# Patient Record
Sex: FEMALE | Race: WHITE | HISPANIC OR LATINO | URBAN - METROPOLITAN AREA
[De-identification: names, ages, dates, MRNs, and addresses within clinical notes are randomized per-mention and may not be internally consistent; named-entity substitution may affect disease eponyms.]

---

## 2019-06-09 ENCOUNTER — APPOINTMENT (OUTPATIENT)
Dept: RADIOLOGY | Facility: MEDICAL CENTER | Age: 74
DRG: 023 | End: 2019-06-09
Attending: EMERGENCY MEDICINE

## 2019-06-09 ENCOUNTER — HOSPITAL ENCOUNTER (INPATIENT)
Facility: MEDICAL CENTER | Age: 74
LOS: 5 days | DRG: 023 | End: 2019-06-14
Attending: EMERGENCY MEDICINE | Admitting: INTERNAL MEDICINE

## 2019-06-09 LAB
ALBUMIN SERPL BCP-MCNC: 4.7 G/DL (ref 3.2–4.9)
ALBUMIN/GLOB SERPL: 1.3 G/DL
ALP SERPL-CCNC: 69 U/L (ref 30–99)
ALT SERPL-CCNC: 23 U/L (ref 2–50)
ANION GAP SERPL CALC-SCNC: 10 MMOL/L (ref 0–11.9)
APTT PPP: 24.2 SEC (ref 24.7–36)
AST SERPL-CCNC: 33 U/L (ref 12–45)
BASOPHILS # BLD AUTO: 0.5 % (ref 0–1.8)
BASOPHILS # BLD: 0.04 K/UL (ref 0–0.12)
BILIRUB SERPL-MCNC: 0.3 MG/DL (ref 0.1–1.5)
BUN SERPL-MCNC: 18 MG/DL (ref 8–22)
CALCIUM SERPL-MCNC: 9.6 MG/DL (ref 8.5–10.5)
CHLORIDE SERPL-SCNC: 103 MMOL/L (ref 96–112)
CO2 SERPL-SCNC: 25 MMOL/L (ref 20–33)
CREAT SERPL-MCNC: 1.16 MG/DL (ref 0.5–1.4)
EKG IMPRESSION: NORMAL
EOSINOPHIL # BLD AUTO: 0.12 K/UL (ref 0–0.51)
EOSINOPHIL NFR BLD: 1.6 % (ref 0–6.9)
ERYTHROCYTE [DISTWIDTH] IN BLOOD BY AUTOMATED COUNT: 43.1 FL (ref 35.9–50)
GLOBULIN SER CALC-MCNC: 3.5 G/DL (ref 1.9–3.5)
GLUCOSE BLD-MCNC: 112 MG/DL (ref 65–99)
GLUCOSE SERPL-MCNC: 111 MG/DL (ref 65–99)
HCT VFR BLD AUTO: 40.2 % (ref 37–47)
HGB BLD-MCNC: 12.9 G/DL (ref 12–16)
IMM GRANULOCYTES # BLD AUTO: 0.02 K/UL (ref 0–0.11)
IMM GRANULOCYTES NFR BLD AUTO: 0.3 % (ref 0–0.9)
INR PPP: 1.12 (ref 0.87–1.13)
LYMPHOCYTES # BLD AUTO: 4.2 K/UL (ref 1–4.8)
LYMPHOCYTES NFR BLD: 55.9 % (ref 22–41)
MCH RBC QN AUTO: 28.7 PG (ref 27–33)
MCHC RBC AUTO-ENTMCNC: 32.1 G/DL (ref 33.6–35)
MCV RBC AUTO: 89.5 FL (ref 81.4–97.8)
MONOCYTES # BLD AUTO: 0.58 K/UL (ref 0–0.85)
MONOCYTES NFR BLD AUTO: 7.7 % (ref 0–13.4)
NEUTROPHILS # BLD AUTO: 2.56 K/UL (ref 2–7.15)
NEUTROPHILS NFR BLD: 34 % (ref 44–72)
NRBC # BLD AUTO: 0 K/UL
NRBC BLD-RTO: 0 /100 WBC
PLATELET # BLD AUTO: 478 K/UL (ref 164–446)
PMV BLD AUTO: 10.1 FL (ref 9–12.9)
POTASSIUM SERPL-SCNC: 4.3 MMOL/L (ref 3.6–5.5)
PROT SERPL-MCNC: 8.2 G/DL (ref 6–8.2)
PROTHROMBIN TIME: 14.7 SEC (ref 12–14.6)
RBC # BLD AUTO: 4.49 M/UL (ref 4.2–5.4)
SODIUM SERPL-SCNC: 138 MMOL/L (ref 135–145)
TROPONIN I SERPL-MCNC: <0.01 NG/ML (ref 0–0.04)
WBC # BLD AUTO: 7.5 K/UL (ref 4.8–10.8)

## 2019-06-09 PROCEDURE — 82962 GLUCOSE BLOOD TEST: CPT

## 2019-06-09 PROCEDURE — 700117 HCHG RX CONTRAST REV CODE 255: Performed by: EMERGENCY MEDICINE

## 2019-06-09 PROCEDURE — 80053 COMPREHEN METABOLIC PANEL: CPT

## 2019-06-09 PROCEDURE — 70498 CT ANGIOGRAPHY NECK: CPT

## 2019-06-09 PROCEDURE — 84443 ASSAY THYROID STIM HORMONE: CPT

## 2019-06-09 PROCEDURE — 3E03317 INTRODUCTION OF OTHER THROMBOLYTIC INTO PERIPHERAL VEIN, PERCUTANEOUS APPROACH: ICD-10-PCS | Performed by: PSYCHIATRY & NEUROLOGY

## 2019-06-09 PROCEDURE — 37195 THROMBOLYTIC THERAPY STROKE: CPT

## 2019-06-09 PROCEDURE — 83735 ASSAY OF MAGNESIUM: CPT

## 2019-06-09 PROCEDURE — 85610 PROTHROMBIN TIME: CPT

## 2019-06-09 PROCEDURE — 99291 CRITICAL CARE FIRST HOUR: CPT

## 2019-06-09 PROCEDURE — 84484 ASSAY OF TROPONIN QUANT: CPT

## 2019-06-09 PROCEDURE — 0042T CT-CEREBRAL PERFUSION ANALYSIS: CPT

## 2019-06-09 PROCEDURE — 94760 N-INVAS EAR/PLS OXIMETRY 1: CPT

## 2019-06-09 PROCEDURE — 99223 1ST HOSP IP/OBS HIGH 75: CPT | Performed by: PSYCHIATRY & NEUROLOGY

## 2019-06-09 PROCEDURE — 85025 COMPLETE CBC W/AUTO DIFF WBC: CPT

## 2019-06-09 PROCEDURE — 93005 ELECTROCARDIOGRAM TRACING: CPT | Performed by: EMERGENCY MEDICINE

## 2019-06-09 PROCEDURE — 70496 CT ANGIOGRAPHY HEAD: CPT

## 2019-06-09 PROCEDURE — 770022 HCHG ROOM/CARE - ICU (200)

## 2019-06-09 PROCEDURE — 700111 HCHG RX REV CODE 636 W/ 250 OVERRIDE (IP): Performed by: PSYCHIATRY & NEUROLOGY

## 2019-06-09 PROCEDURE — 85730 THROMBOPLASTIN TIME PARTIAL: CPT

## 2019-06-09 PROCEDURE — 70450 CT HEAD/BRAIN W/O DYE: CPT

## 2019-06-09 PROCEDURE — 80061 LIPID PANEL: CPT

## 2019-06-09 RX ORDER — SODIUM CHLORIDE 9 MG/ML
50 INJECTION, SOLUTION INTRAVENOUS ONCE
Status: COMPLETED | OUTPATIENT
Start: 2019-06-10 | End: 2019-06-10

## 2019-06-09 RX ADMIN — IOHEXOL 120 ML: 350 INJECTION, SOLUTION INTRAVENOUS at 23:13

## 2019-06-09 RX ADMIN — ALTEPLASE 44.1 MG: KIT at 23:49

## 2019-06-10 ENCOUNTER — HOSPITAL ENCOUNTER (INPATIENT)
Dept: RADIOLOGY | Facility: MEDICAL CENTER | Age: 74
DRG: 023 | End: 2019-06-10
Attending: EMERGENCY MEDICINE

## 2019-06-10 PROBLEM — I10 HYPERTENSION: Status: ACTIVE | Noted: 2019-06-10

## 2019-06-10 PROBLEM — I63.9 STROKE (HCC): Status: ACTIVE | Noted: 2019-06-10

## 2019-06-10 PROBLEM — E78.5 DYSLIPIDEMIA: Status: ACTIVE | Noted: 2019-06-10

## 2019-06-10 LAB
ABO + RH BLD: NORMAL
ABO GROUP BLD: NORMAL
ANION GAP SERPL CALC-SCNC: 8 MMOL/L (ref 0–11.9)
BASOPHILS # BLD AUTO: 0.3 % (ref 0–1.8)
BASOPHILS # BLD: 0.02 K/UL (ref 0–0.12)
BLD GP AB SCN SERPL QL: NORMAL
BUN SERPL-MCNC: 16 MG/DL (ref 8–22)
CALCIUM SERPL-MCNC: 8.3 MG/DL (ref 8.5–10.5)
CHLORIDE SERPL-SCNC: 107 MMOL/L (ref 96–112)
CHOLEST SERPL-MCNC: 192 MG/DL (ref 100–199)
CO2 SERPL-SCNC: 22 MMOL/L (ref 20–33)
CREAT SERPL-MCNC: 1.07 MG/DL (ref 0.5–1.4)
EOSINOPHIL # BLD AUTO: 0.01 K/UL (ref 0–0.51)
EOSINOPHIL NFR BLD: 0.1 % (ref 0–6.9)
ERYTHROCYTE [DISTWIDTH] IN BLOOD BY AUTOMATED COUNT: 44.8 FL (ref 35.9–50)
EST. AVERAGE GLUCOSE BLD GHB EST-MCNC: 134 MG/DL
GLUCOSE SERPL-MCNC: 151 MG/DL (ref 65–99)
HBA1C MFR BLD: 6.3 % (ref 0–5.6)
HCT VFR BLD AUTO: 38.1 % (ref 37–47)
HDLC SERPL-MCNC: 48 MG/DL
HGB BLD-MCNC: 12.1 G/DL (ref 12–16)
IMM GRANULOCYTES # BLD AUTO: 0.02 K/UL (ref 0–0.11)
IMM GRANULOCYTES NFR BLD AUTO: 0.3 % (ref 0–0.9)
LDLC SERPL CALC-MCNC: 112 MG/DL
LYMPHOCYTES # BLD AUTO: 1.09 K/UL (ref 1–4.8)
LYMPHOCYTES NFR BLD: 15.2 % (ref 22–41)
MAGNESIUM SERPL-MCNC: 1.6 MG/DL (ref 1.5–2.5)
MAGNESIUM SERPL-MCNC: 2 MG/DL (ref 1.5–2.5)
MCH RBC QN AUTO: 28.8 PG (ref 27–33)
MCHC RBC AUTO-ENTMCNC: 31.8 G/DL (ref 33.6–35)
MCV RBC AUTO: 90.7 FL (ref 81.4–97.8)
MONOCYTES # BLD AUTO: 0.27 K/UL (ref 0–0.85)
MONOCYTES NFR BLD AUTO: 3.8 % (ref 0–13.4)
NEUTROPHILS # BLD AUTO: 5.77 K/UL (ref 2–7.15)
NEUTROPHILS NFR BLD: 80.3 % (ref 44–72)
NRBC # BLD AUTO: 0 K/UL
NRBC BLD-RTO: 0 /100 WBC
PLATELET # BLD AUTO: 412 K/UL (ref 164–446)
PMV BLD AUTO: 9.9 FL (ref 9–12.9)
POTASSIUM SERPL-SCNC: 4.1 MMOL/L (ref 3.6–5.5)
RBC # BLD AUTO: 4.2 M/UL (ref 4.2–5.4)
RH BLD: NORMAL
SODIUM SERPL-SCNC: 137 MMOL/L (ref 135–145)
TRIGL SERPL-MCNC: 158 MG/DL (ref 0–149)
TSH SERPL DL<=0.005 MIU/L-ACNC: 1.27 UIU/ML (ref 0.38–5.33)
WBC # BLD AUTO: 7.2 K/UL (ref 4.8–10.8)

## 2019-06-10 PROCEDURE — 99291 CRITICAL CARE FIRST HOUR: CPT | Performed by: INTERNAL MEDICINE

## 2019-06-10 PROCEDURE — A9270 NON-COVERED ITEM OR SERVICE: HCPCS | Performed by: HOSPITALIST

## 2019-06-10 PROCEDURE — 700105 HCHG RX REV CODE 258: Performed by: INTERNAL MEDICINE

## 2019-06-10 PROCEDURE — 86850 RBC ANTIBODY SCREEN: CPT

## 2019-06-10 PROCEDURE — 83036 HEMOGLOBIN GLYCOSYLATED A1C: CPT

## 2019-06-10 PROCEDURE — B3131ZZ FLUOROSCOPY OF RIGHT COMMON CAROTID ARTERY USING LOW OSMOLAR CONTRAST: ICD-10-PCS | Performed by: RADIOLOGY

## 2019-06-10 PROCEDURE — 99233 SBSQ HOSP IP/OBS HIGH 50: CPT | Performed by: PSYCHIATRY & NEUROLOGY

## 2019-06-10 PROCEDURE — 700111 HCHG RX REV CODE 636 W/ 250 OVERRIDE (IP)

## 2019-06-10 PROCEDURE — B3161ZZ FLUOROSCOPY OF RIGHT INTERNAL CAROTID ARTERY USING LOW OSMOLAR CONTRAST: ICD-10-PCS | Performed by: RADIOLOGY

## 2019-06-10 PROCEDURE — 83735 ASSAY OF MAGNESIUM: CPT

## 2019-06-10 PROCEDURE — 700102 HCHG RX REV CODE 250 W/ 637 OVERRIDE(OP): Performed by: HOSPITALIST

## 2019-06-10 PROCEDURE — 4410455 IR-THROMBO MECHANICAL ARTERY,INIT

## 2019-06-10 PROCEDURE — 700105 HCHG RX REV CODE 258: Performed by: PSYCHIATRY & NEUROLOGY

## 2019-06-10 PROCEDURE — 86901 BLOOD TYPING SEROLOGIC RH(D): CPT

## 2019-06-10 PROCEDURE — 86900 BLOOD TYPING SEROLOGIC ABO: CPT

## 2019-06-10 PROCEDURE — 80048 BASIC METABOLIC PNL TOTAL CA: CPT

## 2019-06-10 PROCEDURE — 85025 COMPLETE CBC W/AUTO DIFF WBC: CPT

## 2019-06-10 PROCEDURE — A9270 NON-COVERED ITEM OR SERVICE: HCPCS | Performed by: INTERNAL MEDICINE

## 2019-06-10 PROCEDURE — 700117 HCHG RX CONTRAST REV CODE 255: Performed by: RADIOLOGY

## 2019-06-10 PROCEDURE — 92610 EVALUATE SWALLOWING FUNCTION: CPT

## 2019-06-10 PROCEDURE — 71045 X-RAY EXAM CHEST 1 VIEW: CPT

## 2019-06-10 PROCEDURE — 700102 HCHG RX REV CODE 250 W/ 637 OVERRIDE(OP): Performed by: INTERNAL MEDICINE

## 2019-06-10 PROCEDURE — 700111 HCHG RX REV CODE 636 W/ 250 OVERRIDE (IP): Performed by: HOSPITALIST

## 2019-06-10 PROCEDURE — 770022 HCHG ROOM/CARE - ICU (200)

## 2019-06-10 PROCEDURE — 03CG3ZZ EXTIRPATION OF MATTER FROM INTRACRANIAL ARTERY, PERCUTANEOUS APPROACH: ICD-10-PCS | Performed by: RADIOLOGY

## 2019-06-10 PROCEDURE — 99223 1ST HOSP IP/OBS HIGH 75: CPT | Performed by: INTERNAL MEDICINE

## 2019-06-10 RX ORDER — MAGNESIUM SULFATE HEPTAHYDRATE 40 MG/ML
2 INJECTION, SOLUTION INTRAVENOUS ONCE
Status: COMPLETED | OUTPATIENT
Start: 2019-06-10 | End: 2019-06-10

## 2019-06-10 RX ORDER — ATORVASTATIN CALCIUM 40 MG/1
40 TABLET, FILM COATED ORAL EVERY EVENING
Status: DISCONTINUED | OUTPATIENT
Start: 2019-06-10 | End: 2019-06-14 | Stop reason: HOSPADM

## 2019-06-10 RX ORDER — MIDAZOLAM HYDROCHLORIDE 1 MG/ML
.5-2 INJECTION INTRAMUSCULAR; INTRAVENOUS PRN
Status: DISCONTINUED | OUTPATIENT
Start: 2019-06-10 | End: 2019-06-10 | Stop reason: HOSPADM

## 2019-06-10 RX ORDER — ACETAMINOPHEN 325 MG/1
650 TABLET ORAL EVERY 4 HOURS PRN
Status: DISCONTINUED | OUTPATIENT
Start: 2019-06-10 | End: 2019-06-14 | Stop reason: HOSPADM

## 2019-06-10 RX ORDER — ONDANSETRON 2 MG/ML
4 INJECTION INTRAMUSCULAR; INTRAVENOUS PRN
Status: DISCONTINUED | OUTPATIENT
Start: 2019-06-10 | End: 2019-06-10 | Stop reason: HOSPADM

## 2019-06-10 RX ORDER — ASPIRIN 81 MG/1
324 TABLET, CHEWABLE ORAL DAILY
Status: DISCONTINUED | OUTPATIENT
Start: 2019-06-11 | End: 2019-06-14 | Stop reason: HOSPADM

## 2019-06-10 RX ORDER — SODIUM CHLORIDE 9 MG/ML
INJECTION, SOLUTION INTRAVENOUS CONTINUOUS
Status: DISCONTINUED | OUTPATIENT
Start: 2019-06-10 | End: 2019-06-10

## 2019-06-10 RX ORDER — ASPIRIN 325 MG
325 TABLET ORAL DAILY
Status: DISCONTINUED | OUTPATIENT
Start: 2019-06-11 | End: 2019-06-14 | Stop reason: HOSPADM

## 2019-06-10 RX ORDER — HYDRALAZINE HYDROCHLORIDE 20 MG/ML
10 INJECTION INTRAMUSCULAR; INTRAVENOUS
Status: DISCONTINUED | OUTPATIENT
Start: 2019-06-10 | End: 2019-06-14 | Stop reason: HOSPADM

## 2019-06-10 RX ORDER — LABETALOL HYDROCHLORIDE 5 MG/ML
10 INJECTION, SOLUTION INTRAVENOUS EVERY 4 HOURS PRN
Status: DISCONTINUED | OUTPATIENT
Start: 2019-06-10 | End: 2019-06-10

## 2019-06-10 RX ORDER — LABETALOL HYDROCHLORIDE 5 MG/ML
10 INJECTION, SOLUTION INTRAVENOUS EVERY 4 HOURS PRN
Status: DISCONTINUED | OUTPATIENT
Start: 2019-06-10 | End: 2019-06-14 | Stop reason: HOSPADM

## 2019-06-10 RX ORDER — HYDRALAZINE HYDROCHLORIDE 20 MG/ML
10 INJECTION INTRAMUSCULAR; INTRAVENOUS
Status: DISCONTINUED | OUTPATIENT
Start: 2019-06-10 | End: 2019-06-10

## 2019-06-10 RX ORDER — ASPIRIN 300 MG/1
300 SUPPOSITORY RECTAL DAILY
Status: DISCONTINUED | OUTPATIENT
Start: 2019-06-11 | End: 2019-06-14 | Stop reason: HOSPADM

## 2019-06-10 RX ORDER — SODIUM CHLORIDE 9 MG/ML
500 INJECTION, SOLUTION INTRAVENOUS
Status: DISCONTINUED | OUTPATIENT
Start: 2019-06-10 | End: 2019-06-10 | Stop reason: HOSPADM

## 2019-06-10 RX ORDER — MIDAZOLAM HYDROCHLORIDE 1 MG/ML
INJECTION INTRAMUSCULAR; INTRAVENOUS
Status: COMPLETED
Start: 2019-06-10 | End: 2019-06-10

## 2019-06-10 RX ADMIN — ACETAMINOPHEN 650 MG: 325 TABLET, FILM COATED ORAL at 22:12

## 2019-06-10 RX ADMIN — MIDAZOLAM HYDROCHLORIDE 1 MG: 1 INJECTION, SOLUTION INTRAMUSCULAR; INTRAVENOUS at 01:20

## 2019-06-10 RX ADMIN — MIDAZOLAM HYDROCHLORIDE 1 MG: 1 INJECTION INTRAMUSCULAR; INTRAVENOUS at 01:20

## 2019-06-10 RX ADMIN — HYDRALAZINE HYDROCHLORIDE 10 MG: 20 INJECTION INTRAMUSCULAR; INTRAVENOUS at 21:06

## 2019-06-10 RX ADMIN — SODIUM CHLORIDE 50 ML: 9 INJECTION, SOLUTION INTRAVENOUS at 00:49

## 2019-06-10 RX ADMIN — SODIUM CHLORIDE: 9 INJECTION, SOLUTION INTRAVENOUS at 03:03

## 2019-06-10 RX ADMIN — MAGNESIUM SULFATE IN WATER 2 G: 40 INJECTION, SOLUTION INTRAVENOUS at 11:15

## 2019-06-10 RX ADMIN — IOHEXOL 70 ML: 300 INJECTION, SOLUTION INTRAVENOUS at 01:30

## 2019-06-10 RX ADMIN — ACETAMINOPHEN 650 MG: 325 TABLET, FILM COATED ORAL at 16:18

## 2019-06-10 RX ADMIN — ATORVASTATIN CALCIUM 40 MG: 40 TABLET, FILM COATED ORAL at 18:41

## 2019-06-10 ASSESSMENT — ENCOUNTER SYMPTOMS
BACK PAIN: 0
DOUBLE VISION: 0
DIZZINESS: 0
SHORTNESS OF BREATH: 0
FEVER: 0
HEADACHES: 0
NECK PAIN: 0
SENSORY CHANGE: 0
FOCAL WEAKNESS: 0
CHILLS: 0
SORE THROAT: 0

## 2019-06-10 ASSESSMENT — COGNITIVE AND FUNCTIONAL STATUS - GENERAL
MOBILITY SCORE: 15
MOVING FROM LYING ON BACK TO SITTING ON SIDE OF FLAT BED: UNABLE
DAILY ACTIVITIY SCORE: 24
SUGGESTED CMS G CODE MODIFIER DAILY ACTIVITY: CH
SUGGESTED CMS G CODE MODIFIER MOBILITY: CK
MOVING TO AND FROM BED TO CHAIR: UNABLE
TURNING FROM BACK TO SIDE WHILE IN FLAT BAD: UNABLE

## 2019-06-10 ASSESSMENT — LIFESTYLE VARIABLES
EVER_SMOKED: NEVER
ALCOHOL_USE: NO
EVER_SMOKED: NEVER

## 2019-06-10 ASSESSMENT — PATIENT HEALTH QUESTIONNAIRE - PHQ9
2. FEELING DOWN, DEPRESSED, IRRITABLE, OR HOPELESS: NOT AT ALL
1. LITTLE INTEREST OR PLEASURE IN DOING THINGS: NOT AT ALL
SUM OF ALL RESPONSES TO PHQ9 QUESTIONS 1 AND 2: 0

## 2019-06-10 ASSESSMENT — COPD QUESTIONNAIRES
DURING THE PAST 4 WEEKS HOW MUCH DID YOU FEEL SHORT OF BREATH: NONE/LITTLE OF THE TIME
DO YOU EVER COUGH UP ANY MUCUS OR PHLEGM?: NO/ONLY WITH OCCASIONAL COLDS OR INFECTIONS
HAVE YOU SMOKED AT LEAST 100 CIGARETTES IN YOUR ENTIRE LIFE: NO/DON'T KNOW
COPD SCREENING SCORE: 2

## 2019-06-10 NOTE — DISCHARGE PLANNING
Anticipated Discharge Disposition: Unknown    Action: Work Excuse Letter.     Provided pt's family member with a Work Excuse Letter. Placed copy in pt chart chart.     Barriers to Discharge: none    Plan: assist as needed.

## 2019-06-10 NOTE — ASSESSMENT & PLAN NOTE
Reported history of, uncertain of any home medications  At this time will be under TPA protocol as indicated above

## 2019-06-10 NOTE — PROGRESS NOTES
Name:  Remi   ID Number:  [id number]    Content Discussed:  Neuro assessment, NIHSS, plan of care, conversation with neurology, head to toe assessment, interventions, medications, diet instruction.

## 2019-06-10 NOTE — ASSESSMENT & PLAN NOTE
Acute right M1 occlusion  Status post TPA initiated at 23:48 on 6/9  Plan for IR thrombectomy  Post TPA protocols  Every hour neurochecks  Strict BP parameters to maintain less than 180/105 pending IR intervention which may require lower parameters  No antiplatelet or anticoagulant therapy in the first 24 hours  MRI of the brain  Repeat CT head 24 hours post TPA, sooner if any neurologic deterioration  Aspiration precautions  Secondary stroke work-up- A1c, lipid panel, echo, cardiac monitoring  PT/OT/speech

## 2019-06-10 NOTE — ASSESSMENT & PLAN NOTE
Status post TPA at 11:45 PM on 6/9/2019   CTA head with IV contrast reveals distal right M1 occlusion, patient will be taken to IR for mechanical thrombectomy  statin and aspirin  Neurology consulting  PT/OT/ST  Normal TSH  May need loop recorder; depends on whether she stays in the [place] or goes back to [facility]

## 2019-06-10 NOTE — THERAPY
Physical Therapy Contact Note    PT order received and acknowledged. Patient with active bed rest orders following alteplase infusion. Will re attempt as appropriate and able.    Carolina Grimes, PT, DPT  [phone]

## 2019-06-10 NOTE — PROGRESS NOTES
Name:  Marleni   ID Number:  [id number]    Content Discussed:  Attempt to complete MRI screening questionnaire. Will readdress when daughter is available, as she is more aware of patient's history per son Kareem.

## 2019-06-10 NOTE — THERAPY
Occupational Therapy Contact Note    OT order received. Pt received alteplase infusion around 11:45pm last night. Will hold until patient off bedrest and appropriate to participate.    Karen Bethea OTR/L

## 2019-06-10 NOTE — CARE PLAN
Problem: Skin Integrity  Goal: Risk for impaired skin integrity will decrease  Outcome: PROGRESSING AS EXPECTED  Q2h turns, 2 RN skin check, bathing, oral care.    Problem: Acute Care of the Stroke Patient  Goal: Optimal Outcome for the Stroke patient  Outcome: PROGRESSING AS EXPECTED  Patient and family updated on stroke information and disease process as well as medications and intervention.  Vital signs, pulses, sheath removal site and neuro status checked per policy.  Imaging completed/ordered.

## 2019-06-10 NOTE — PROGRESS NOTES
2 RN skin check complete.   Devices in place ekg leads, bp cuff, pulse oximeter, scds, 2 PIV.  Skin assessed under devices listed above.  Confirmed pressure ulcers found on n/a.  New potential pressure ulcers noted on n/a. Wound consult placed n/a.  The following interventions in place turns q2h, pillows for positioning, rotating medical devices, mepilex placed on sacrum.

## 2019-06-10 NOTE — DISCHARGE PLANNING
PMR referral from Dr. Newberry     Stroke protocol s/p TPA . PT/OT evaluations pending. Anticipate post acute services. Please have PFA interview for potential insurance. Patient is from [facility] and visiting family here. Will follow. No physiatry consult ordered at this time.

## 2019-06-10 NOTE — CONSULTS
CC: Stroke    Date of Admission: 6/9/2019    Today's Date: 06/10/19    Consulting Physician: Marlyn Steen D.O.      HPI:    Ms. Adrienne Alvarez is a 73 yo [place] Speaking F from [facility] who presented with acute stroke. She is interviewed today with the assistance of a . Her son is at the bedside.       Ms. Alvarez as admitted at 11pm on 6/9/19 one hour after developing left UE weakness and expressive aphasia. She is currently visiting family from [facility] on a visa. CTA of the head showed right MCA occlusion and CT Perfusion scan showed an area of infarction in the right MCA distribution with a larger area of ischemic penumbra. She is now s/p iv TPA which was given at 11:45pm on 6/9/19, 45 minutes after ER arrival and 1 hour 45 minutes after symptom onset. NIHSS upon admission was documented to be 26. BS was 111 and she was not on oral anticoagulation. Currently on Lipitor 40mg and ASA 325mg.    Dr. Irving Armstrong performed thrombectomy procedure and the patient was transferred to the [facility] ICU where she is currently being monitored according to post-stroke protocol. She tells me that she had stroke symptoms 3 years ago when she was hospitalized and told to take a medication for the subsequent 3 months. After this she did not have Neurology follow up. At that time she had diarrhea and vomiting and also had difficulty gripping things with her right hand. She had difficulty walking as well and required two people to assist her.      ROS:   Constitutional: No fevers or chills.  Eyes: Denies Vision changes.   ENT: + dysphagia.  Respiratory: No shortness of breath.  Cardiovascular: No chest pain or palpitations.  GI: No nausea, vomiting.  Skin: No skin rashes.  Neuro: + mild left neck/ headache.  All other systems were reviewed and were negative.     Past Medical History: No past medical history on file.    Past Surgical History: History reviewed. No pertinent surgical  "history.    Social History:   Social History     Social History   • Marital status: N/A     Spouse name: N/A   • Number of children: N/A   • Years of education: N/A     Occupational History   • Not on file.     Social History Main Topics   • Smoking status: Never Smoker   • Smokeless tobacco: Never Used   • Alcohol use No   • Drug use: No   • Sexual activity: Not on file     Other Topics Concern   • Not on file     Social History Narrative   • No narrative on file       Family History: History reviewed. No pertinent family history.    Allergies: No Known Allergies      Current Facility-Administered Medications:   •  Respiratory Care per Protocol, , Nebulization, Continuous RT, Emmanuel Newberry M.D.  •  labetalol (NORMODYNE,TRANDATE) injection 10 mg, 10 mg, Intravenous, Q4HRS PRN **OR** hydrALAZINE (APRESOLINE) injection 10 mg, 10 mg, Intravenous, Q2HRS PRN, Emmanuel Newberry M.D.  •  [START ON 6/11/2019] aspirin (ASA) tablet 325 mg, 325 mg, Oral, DAILY **OR** [START ON 6/11/2019] aspirin (ASA) chewable tab 324 mg, 324 mg, Oral, DAILY **OR** [START ON 6/11/2019] aspirin (ASA) suppository 300 mg, 300 mg, Rectal, DAILY, Emmanuel Newberry M.D.  •  atorvastatin (LIPITOR) tablet 40 mg, 40 mg, Oral, Q EVENING, Emmanuel Newberry M.D.  •  NS infusion, , Intravenous, Continuous, Emmanuel Newberry M.D., Last Rate: 50 mL/hr at 06/10/19 0303      Physical Exam:   Ambulatory Vitals  Encounter Vitals  Temp: 36.7 °C (98.1 °F)  Temp src: Temporal  BP: 135/58  BP Location: Right, Upper Arm  Patient BP Position: Supine  Pulse: 57  Resp: 18  SpO2: 96 %  O2 (LPM): 2  O2 Delivery: None (Room Air)  Weight: 57.6 kg (126 lb 15.8 oz)  Weight Source: Bed Scale  Reason weight was either estimated or stated: Non-weight bearing  Height: 157.5 cm (5' 2\")  BMI (Calculated): 23.23  Location: Head  Description: Aching    Constitutional: Well-developed, well-nourished, good hygiene. Appears stated age.  Cardiovascular: RRR, with no murmurs, rubs or gallops. No peripheral " edema, pedal pulses intact.   Respiratory: Lungs CTA B/L, no W/R/R. Respiratory effort appears normal.    Skin: Warm, dry, intact. No rashes observed.  Eyes: Sclera anicteric. No eyelid ptosis.  Abdomen: Soft NTTP. No hepatosplenomegaly.   ENMT: Oropharynx clear.   Extremities: No swelling or edema.  Neck: No bruits.   Neurologic:   Mental Status: Awake, alert.   Speech: +dysarthric.   Concentration: Able to follow commands.              Fund of Knowledge: Appropriate   Cranial Nerves:     CN II: PERRL. No afferent pupillary defect.    CN III, IV, VI: Good eye closure, EOMI.     CN V: Facial sensation intact and symmetric.     CN VII: Left facial weakness.     CN VIII: Hearing intact.     CN IX and X: Palate elevates symmetrically. Normal gag reflex.    CN XI: Decreased left shoulder shrug.    CN XII: Tongue midline.    Sensory: +extinction on the left.   Coordination: Decreased coordination in the bilateral hands L>R due to weakness.           DTR's: 2+ throughout without clonus.    Babinski: Left toe mute, 4+ in the left ankle with few beats of nonsustained clonus. Right toe downgoing.   Romberg: Deferred.   Movements: No tremors observed.   Musculoskeletal:    Strength: Left hemiparesis.    Gait: Unable to test.   Tone: Normal bulk and tone.   Joints: No swelling in the joints of hands or knees.     1a. LOC: 0  1b. LOC Questions: 0  1c. LOC Commands: 0  2. Best Gaze: 0  3. Visual Fields: 1  4. Facial Paresis: 2  5a. Motor arm left: 1  5b. Motor arm right: 0  6a. Motor leg left: 1  6b. Motor leg right: 0  7. Sensory: 2  8. Best Language: 1  9. Limb Ataxia: 0  10. Dysarthria: 1  11. Extinction/Inattention: 1    NIHSS: 10      Labs:  Recent Labs      06/09/19   2302  06/10/19   0420   WBC  7.5  7.2   RBC  4.49*  4.20*   HEMOGLOBIN  12.9*  12.1*   HEMATOCRIT  40.2*  38.1*   MCV  89.5  90.7   MCH  28.7  28.8   RDW  43.1  44.8   PLATELETCT  478*  412   MPV  10.1  9.9   NEUTSPOLYS  34.00*  80.30*   LYMPHOCYTES  55.90*   15.20*   MONOCYTES  7.70  3.80   EOSINOPHILS  1.60  0.10   BASOPHILS  0.50  0.30       Recent Labs      06/09/19   2302  06/10/19   0420   SODIUM  138  137   POTASSIUM  4.3  4.1   CHLORIDE  103  107   CO2  25  22   GLUCOSE  111*  151*   BUN  18  16         Imaging Reviewed:    CT Head w/o from 6/9/19  1.  No acute intracranial abnormality is identified, there are nonspecific white matter changes, commonly associated with small vessel ischemic disease.  Associated mild cerebral atrophy is noted.    CT Perfusion 6/9/19  1.  Cerebral blood flow less than 30% likely representing completed infarct = 50 mL.    2.  T Max more than 6 seconds likely representing combination of completed infarct and ischemia = 99 mL.    3.  Mismatched volume likely representing ischemic brain/penumbra = 49 mL    4.  Please note that the cerebral perfusion was performed on the limited brain tissue around the basal ganglia region. Infarct/ischemia outside the CT perfusion sections can be missed in this study.    CTA Head and Neck from 6/9/19    1.  Distal right M1 occlusion  2.  Hypoplastic left A1  3.  Persistent fetal morphology of the right posterior cerebral artery.  4.  Hypoplastic left vertebral artery, otherwise CT angiogram of the neck within normal limits.       Assessment/Plan:  Stroke (HCC)  Status post TPA at 11:45 PM on 6/9/2019  CTA head with IV contrast reveals distal right M1 occlusion, patient will be taken to IR for mechanical thrombectomy  Patient will be admitted to the ICU with neurochecks per TPA protocol  Patient will be placed on continuous cardiac monitoring to evaluate for any arrhythmias  I will order MRI brain for further evaluation  Check 2-D echo  Patient will be started on full dose aspirin 24 hours after TPA administration  Patient is a started on high intensity atorvastatin  Strict blood pressure control with IV hydralazine and labetalol for SBP greater than 180/105  IV fluid hydration with normal saline  Check  TSH, lipid panel and hemoglobin A1c  PTOT and ST evaluation  Neurology consulted          Hypertension  Allow permissive hypertension per TPA protocol  IV hydralazine and labetalol for SBP greater than 180 and DBP greater than 105    Stroke (HCC)  Acute right M1 occlusion  Status post TPA initiated at 23:48 on 6/9  Plan for IR thrombectomy  Post TPA protocols  Every hour neurochecks  Strict BP parameters to maintain less than 180/105 pending IR intervention which may require lower parameters  No antiplatelet or anticoagulant therapy in the first 24 hours  MRI of the brain  Repeat CT head 24 hours post TPA, sooner if any neurologic deterioration  Aspiration precautions  Secondary stroke work-up- A1c, lipid panel, echo, cardiac monitoring  PT/OT/speech    Hypertension  Reported history of, uncertain of any home medications  At this time will be under TPA protocol as indicated above    Assessment/Plan:  Ms. Adrienne Alvarez is a 73 yo F with pmhx of prior stroke 3 years ago, CT shows chronic right cerebellar encephalomalacia, and hyperlipidemia, who presented to [facility] with acute onset left face and arm weakness found to have acute right MCA occlusion treated with iv TPA within 1 hour 45 minutes of stroke symptom onset and subsequent thrombectomy procedure. Her current stroke as well as her stroke from 3 years ago are highly suspicious for cardioembolic source. Atrial fibrillation has not been documented, although given the high risk of stroke recurrence, I would recommend eventually treating with anticoagulation.     Plan:  1. Continue with ASA 325mg now.   2. Transthoracic echocardiogram - if no abnormalities, RODRIGUEZ should be performed.   3. Permissive hypertension according to the ICU post-ivTPA stroke protocol.  4. Continue Atorvastatin.   5. 24 hour post-iv TPA/thrombectomy head CT ordered and scheduled for later today  6. MRI pending.   7. Needs PT/OT.       Marlyn Steen D.O., M.P.H  MS specialist.   Board Certified  Neurologist.  Neurology Clerkship Director, [facility].    Neurology,  [facility].   Tel: [phone]  Fax: [phone]

## 2019-06-10 NOTE — CONSULTS
Telestroke Consultation Note    Please note that I could not evaluate the patient in person at the site. All examination and evaluation of the patient and information gathering from the patient and/or the family is done jointly by me via licensed and encrypted tele-video communication equipment and the requesting physician, Dr. Madhu Osei M.D.  As such, my assessments and recommendations are limited as far as such limited evaluation allows.    Hospital: [facility]  Consulting MD:Hannah Francisco M.D.  Requesting Physician: Madhu Osei M.D.  Patient name:      Edilberto Ann  :         1898  MRN:         [id number]  Video Time:        23:35     Date of Service: 2019    Chief Complaint: ED Acute stroke code  Left sided weakness    History of Present Illness:  Kernel Forty-One is a 73 years old right-handed female with unknown past medical history who was brought to emergency room for evaluation of acute onset of left-sided weakness that happened suddenly around 10 PM.  Apparently his son who is at bedside was helping her to go to bed when she became nonverbal with profound left-sided weakness.  Patient was brought to emergency room and underwent urgent evaluation for acute stroke.  She underwent a noncontrast brain CT which did not reveal acute abnormalities.  Her CTA of the head revealed right M1 occlusion.  Patient herself is nonverbal and is speaking [place] only.  Her son speaks little English but I communicated with him via .  He is not aware of any recent head trauma, surgery, GI or  bleeding.        Time of symptom onset: 2200  TLKW: 2200  Associated symptoms: Left-sided weakness and a aphasia  Alleviating/exacerbating factors: none   Blood glucose: 111  Anticoagulation/Antiplatelet: None    No alleviating or exacerbating factors identified. No other associated symptoms reported.    I have verified the identity of the patient who is to receive IV Alteplase  "by confirming the patient's name and date of birth.  I have identified alteplase bottle visually.    TPA DISCUSSION:  Patient and family were counseled on risk and benefits of treatment and verbal consent was obtained to proceed.  The following is the information that was given:  \"There is treatment for your stroke called tissue plasminogen activator (tPA) that must be given within 4.5 hours after the stroke started.  tPA is a \"clot buster\" drug that can lead to some improvement in 1 of every 3 patients treated.  However, it has a major risk, since it can cause severe bleeding in the brain in about 1 of every 15 patients (or 3 to 6.4%).  If bleeding occurs in the brain, it can be fatal.  It is estimated that of 3 patients treated, 1 is harmed by the treatment.    Overall, it is estimted that treatment with intravenous tPA is 10 times more likely to help than to harm eligible patients.  When used to treat large number of stroke patients, on average the potential benefits of this treatment outweigh the risks. However, in any individual patient, it is a very personal decision.\"      Inclusion/exclusion discussed prior to administration of TPA,   Risks discussed with patient's son at 11:40 pm  Delayed tPA administration due to language barrier  BP prior to Bolus: 133/74      IV rtPA was administered as follows:4.9mg (10% of total dose) administered over 1 minute starting at 11:48 PM, followed by infusion 44.1 mg (90% of 0.9mg/kg) over 59 minutes starting at11:49pm.    ROS: Unable to obtain    Allergies:  Allergies not on file    Hospital Medications:    Current Outpatient Medications:    (Not in a hospital admission)    Antithrombotic: None    Past Medical History:  No past medical history on file.    Social History:  Social History     Social History   • Marital status: N/A     Spouse name: N/A   • Number of children: N/A   • Years of education: N/A     Occupational History   • Not on file.     Social History Main " Topics   • Smoking status: Not on file   • Smokeless tobacco: Not on file   • Alcohol use Not on file   • Drug use: Unknown   • Sexual activity: Not on file     Other Topics Concern   • Not on file     Social History Narrative   • No narrative on file       Family History (If relevant):  No family history on file.    Vitals:  Vitals:    06/09/19 2302 06/09/19 2351   BP:  135/58   Pulse:  76   SpO2:  97%   Weight: 54.4 kg (120 lb)        Physical Exam:      NIH Stroke Scale:    1a. Level of Consciousness (Alert, drowsy, etc): 1= Drowsy    1b. LOC Questions (Month, age): 2= Incorrect    1c. LOC Commands (Open/close eyes make fist/let go): 2= Incorrect    2.   Best Gaze (Eyes open - patient follows examiner's finger on face): 1= Partial gaze palay    3.   Visual Fields (introduce visual stimulus/threat to patient's field quadrants): 1= Partial Hemiania    4.   Facial Paresis (Show teeth, raise eyebrows and squeeze eyes shut): 2 = Partial     5a. Motor Arm - Left (Elevate arm to 90 degrees if patient is sitting, 45 degrees if  supine): 4= No movement    5b. Motor Arm - Right (Elevate arm to 90 degrees if patient is sitting, 45 degrees if supine): 1= Drift    6a. Motor Leg - Left (Elevate leg 30 degrees with patient supine): 4= No movement    6b. Motor Leg - Right  (Elevate leg 30 degrees with patient supine): 1= Drift    7.   Limb Ataxia (Finger-nose, heel down shin): 0= No ataxia    8.   Sensory (Pin prick to face, arm, trunk and leg - compare side to side): 2- Severe loss    9.  Best Language (Name item, describe a picture and read sentences): 2= Severe aphasia    10. Dysarthria (Evaluate speech clarity by patient repeating listed words): 2= Near to unintelligible or worse    11. Extinction and Inattention (Use information from prior testing to identify neglect or  double simultaneous stimuli testing): 1= Partial neglect    Total NIH Score: 26        Imaging reviewed &  Visualized by me:    CT HEAD W/O CONTRAST: No  acute intracranial abnormalities  CTA HEAD: 1.  Distal right M1 occlusion  2.  Hypoplastic left A1  3.  Persistent fetal morphology of the right posterior cerebral artery.  CTA NECK:   CTP HEAD: 1.  Cerebral blood flow less than 30% likely representing completed infarct = 50 mL.    2.  T Max more than 6 seconds likely representing combination of completed infarct and ischemia = 99 mL.    3.  Mismatched volume likely representing ischemic brain/penumbra = 49 mL    4.  Please note that the cerebral perfusion was performed on the limited brain tissue around the basal ganglia region. Infarct/ischemia outside the CT perfusion sections can be missed in this study.    Laboratory:    Lab Results  Component Value Date/Time   PLATELETCT 478 (H) 06/09/2019 2302       Lab Results  Component Value Date/Time   PROTHROMBTM 14.7 (H) 06/09/2019 2302   INR 1.12 06/09/2019 2302   APTT 24.2 (L) 06/09/2019 2302       Lab Results  Component Value Date/Time   GLUCOSE 111 (H) 06/09/2019 2302       Lab Results  Component Value Date/Time   CREATININE 1.16 06/09/2019 2302       Lab Results  Component Value Date/Time   IFAFRICA >60 06/09/2019 2302   IFNOTAFR 56 (A) 06/09/2019 2302       Assessment:   73-year-old  female with unknown past medical history who presented with acute onset of left-sided weakness likely due to ischemic his stroke in the distribution of right middle cerebral artery.  Patient is not on any anticoagulation and her INR is 1.12.  Her blood sugar is normal and her platelet is 478.  I had a lengthy discussion with her son at the bedside with regard to administration of TPA.  I reviewed some of exclusion of TPA such as recent head trauma, recent surgery or GI and  bleeding and patient's son is not aware of any of this exclusions.  TPA was administered at 11:48 PM.  Patient may be a good candidate for thrombectomy.  Dr. Osei will discuss with interventional radiologist.  Following this procedure she will be  admitted to the intensive care unit for close neuro monitoring per post TPA and thrombectomy protocol.  We will keep systolic blood pressure less than 180 diastolic less than 105.  She will have brain MRI with and without contrast, echocardiogram.  She will be evaluated by physical therapy, occupational therapy and speech therapy.  She needs to be n.p.o. until she is cleared by speech therapy for swallowing.  She will be on SCDs for DVT prevention.  No antiplatelet or anticoagulant for 24 hours post TPA.  Obtain noncontrast brain CT 24-hour after administration of TPA.          I spent total of 65 minutes more than half of which was spent coordinating this acute stroke care with ED physician, stroke charge nurse, RN, Ppharmacist, CT techs and radiologist as well as reviewing the images and labs results with the patient and the other present, and answering all questions and explaining in details the assessment and recommendation sections above. The patient's son expressed his understanding and agreement to the above.        This consultation was conducted utilizing secure and encrypted videoconferencing equipment with the assistance of a trained tele-presenter at the originating site.

## 2019-06-10 NOTE — DISCHARGE PLANNING
Medical Social Work    Referral: Stroke    Intervention: MSW responded to charge desk.  Pt is a 73 year old female brought in through triage by family for stroke like symptoms.  Pt is Adrienne Jordan (: 1945).  Pt's family at bedside including daughter, Zaina are Korean speaking only.  MSW placed family in Family Support Room per ERP and allowed one person at bedside, son-in-law.  Bedside RN aware.    Plan: SW will follow.

## 2019-06-10 NOTE — PROGRESS NOTES
Patient brought to IR2 by ER RN on transport monitor with alteplase infusing; report received.  Dr. Armstrong received consent for cerebral angiogram with possible thrombectomy from daughter Connie.  All risks and benefits of the procedure discussed with the daughter and questions answered.  Patient transferred to procedure table and monitors attached.  VS obtained.  Pressure points padded and safety straps utilized.  O2 applied at 2L/NC.  Patient noted to open eyes and move right UE and right LE spontaneously.  Patient not following commands or speaking.    Timeout performed and R femoral artery access obtained at 0035. Procedure completed without complication.  Bedside report given to Selma, ICU RN in IR2.  Neuro assessment completed at beside at 0150.  Patient remains aphasic, sedation/language barrier inhibiting complete assessment.  Patient taken to ICU by JORGE Hahn on transport monitor.       Angioseal deployment time: 0127  TICI score: 2b    R Groin:  AngioSeal STS Plus  Vascular Closure Device  8F   REF [id number]  LOT [id number]  EXP 2019-12-31

## 2019-06-10 NOTE — CONSULTS
Critical Care Consultation    Date of consult: 6/10/2019    Referring Physician  Madhu Osei M.D.    Reason for Consultation  Acute CVA    History of Presenting Illness  Indian-speaking only patient who presented 6/9/2019 with acute left-sided deficits and aphasia.  Onset was approximately 10 PM while patient was getting ready to go to bed family noted acute onset left-sided weakness and aphasia    Code stroke called and patient found to have right M1 occlusion, and hypoplastic left vertebral artery, and hypoplastic left A1.  No contra indications for TPA were noted and patient initiated on TPA per protocol, further interventional radiology consulted for thrombectomy.    Code Status  Full Code    Review of Systems  Review of Systems   Unable to perform ROS: Acuity of condition       Past Medical History   has no past medical history on file.  Unable to obtain, presumed hypertension    Surgical History   has no past surgical history on file.    Family History  family history is not on file.  Unable to obtain    Social History   reports that he has never smoked. He has never used smokeless tobacco. He reports that he does not drink alcohol or use drugs.    Medications  Home Medications    **Home medications have not yet been reviewed for this encounter**       Current Facility-Administered Medications   Medication Dose Route Frequency Provider Last Rate Last Dose   • Respiratory Care per Protocol   Nebulization Continuous RT Emmanuel Newberry M.D.       • labetalol (NORMODYNE,TRANDATE) injection 10 mg  10 mg Intravenous Q4HRS PRN Emmanuel Newberry M.D.        Or   • hydrALAZINE (APRESOLINE) injection 10 mg  10 mg Intravenous Q2HRS PRN Emmanuel Newberry M.D.       • [START ON 6/11/2019] aspirin (ASA) tablet 325 mg  325 mg Oral DAILY Emmanuel Newberry M.D.        Or   • [START ON 6/11/2019] aspirin (ASA) chewable tab 324 mg  324 mg Oral DAILY Emmanuel Newberry M.D.        Or   • [START ON 6/11/2019] aspirin (ASA) suppository 300 mg  300  mg Rectal DAILY Emmanuel Newberry M.D.       • atorvastatin (LIPITOR) tablet 40 mg  40 mg Oral Q EVENING Emmanuel Newberry M.D.       • NS infusion   Intravenous Continuous Emmanuel Newberry M.D.       • alteplase (ACTIVASE) BOLUS injection 4.9 mg  0.09 mg/kg Intravenous Once Hannah Francisco M.D.        Followed by   • alteplase (ACTIVASE) injection 44.1 mg  0.81 mg/kg Intravenous Once Hannah Francisco M.D.        Followed by   • NS infusion 50 mL  50 mL Intravenous Once Hannah Francisco M.D.         No current outpatient prescriptions on file.       Allergies  No Known Allergies    Vital Signs last 24 hours  Pulse:  [76] 76  BP: (135)/(58) 135/58  SpO2:  [97 %] 97 %    Physical Exam  Physical Exam   Constitutional: He appears well-developed and well-nourished.   HENT:   Head: Normocephalic and atraumatic.   Eyes: Pupils are equal, round, and reactive to light. Conjunctivae are normal.   Neck: No tracheal deviation present.   Cardiovascular: Normal rate and intact distal pulses.    Pulmonary/Chest: He has no wheezes. He has no rales.   Abdominal: Soft. He exhibits no distension. There is no tenderness.   Musculoskeletal: He exhibits no edema.   Neurological: A cranial nerve deficit is present.   5 out of 5 strength on the right, 4+ out of 5 on the left   Skin: Skin is warm and dry.   Psychiatric: He has a normal mood and affect.   Nursing note and vitals reviewed.      Fluids  No intake or output data in the 24 hours ending 06/10/19 0013    Laboratory  Recent Results (from the past 48 hour(s))   ACCU-CHEK GLUCOSE    Collection Time: 06/09/19 10:57 PM   Result Value Ref Range    Glucose - Accu-Ck 112 (H) 65 - 99 mg/dL   CBC WITH DIFFERENTIAL    Collection Time: 06/09/19 11:02 PM   Result Value Ref Range    WBC 7.5 4.8 - 10.8 K/uL    RBC 4.49 (L) 4.70 - 6.10 M/uL    Hemoglobin 12.9 (L) 14.0 - 18.0 g/dL    Hematocrit 40.2 (L) 42.0 - 52.0 %    MCV 89.5 81.4 - 97.8 fL    MCH 28.7 27.0 - 33.0 pg    MCHC 32.1 (L) 33.6 - 35.0 g/dL    RDW  43.1 35.9 - 50.0 fL    Platelet Count 478 (H) 164 - 446 K/uL    MPV 10.1 9.0 - 12.9 fL    Neutrophils-Polys 34.00 (L) 44.00 - 72.00 %    Lymphocytes 55.90 (H) 22.00 - 41.00 %    Monocytes 7.70 0.00 - 13.40 %    Eosinophils 1.60 0.00 - 6.90 %    Basophils 0.50 0.00 - 1.80 %    Immature Granulocytes 0.30 0.00 - 0.90 %    Nucleated RBC 0.00 /100 WBC    Neutrophils (Absolute) 2.56 1.82 - 7.42 K/uL    Lymphs (Absolute) 4.20 1.00 - 4.80 K/uL    Monos (Absolute) 0.58 0.00 - 0.85 K/uL    Eos (Absolute) 0.12 K/uL    Baso (Absolute) 0.04 0.00 - 0.12 K/uL    Immature Granulocytes (abs) 0.02 0.00 - 0.11 K/uL    NRBC (Absolute) 0.00 K/uL   COMP METABOLIC PANEL    Collection Time: 06/09/19 11:02 PM   Result Value Ref Range    Sodium 138 135 - 145 mmol/L    Potassium 4.3 3.6 - 5.5 mmol/L    Chloride 103 96 - 112 mmol/L    Co2 25 20 - 33 mmol/L    Anion Gap 10.0 0.0 - 11.9    Glucose 111 (H) 65 - 99 mg/dL    Bun 18 8 - 22 mg/dL    Creatinine 1.16 0.50 - 1.40 mg/dL    Calcium 9.6 8.5 - 10.5 mg/dL    AST(SGOT) 33 12 - 45 U/L    ALT(SGPT) 23 2 - 50 U/L    Alkaline Phosphatase 69 U/L    Total Bilirubin 0.3 0.1 - 1.5 mg/dL    Albumin 4.7 3.2 - 4.9 g/dL    Total Protein 8.2 6.0 - 8.2 g/dL    Globulin 3.5 1.9 - 3.5 g/dL    A-G Ratio 1.3 g/dL   PROTHROMBIN TIME    Collection Time: 06/09/19 11:02 PM   Result Value Ref Range    PT 14.7 (H) 12.0 - 14.6 sec    INR 1.12 0.87 - 1.13   APTT    Collection Time: 06/09/19 11:02 PM   Result Value Ref Range    APTT 24.2 (L) 24.7 - 36.0 sec   COD (ADULT)    Collection Time: 06/09/19 11:02 PM   Result Value Ref Range    ABO Grouping Only O     Rh Grouping Only POS     Antibody Screen-Cod NEG    TROPONIN    Collection Time: 06/09/19 11:02 PM   Result Value Ref Range    Troponin I <0.01 0.00 - 0.04 ng/mL   ESTIMATED GFR    Collection Time: 06/09/19 11:02 PM   Result Value Ref Range    GFR If African American >60 >60 mL/min/1.73 m 2    GFR If Non  56 (A) >60 mL/min/1.73 m 2   EKG (NOW)     Collection Time: 19 11:59 PM   Result Value Ref Range    Report       [facility] Emergency Dept.    Test Date:  2019  Pt Name:    MELINDA MADDOX             Department: ER  MRN:        [id number]                      Room:       [facility]  Gender:                                  Technician: [id number]  :                                     Requested By:MIKEY ONEAL  Order #:    [id number]                    Reading MD:    Measurements  Intervals                                Axis  Rate:       76                           P:          44  AK:         164                          QRS:        -5  QRSD:       90                           T:          45  QT:         408  QTc:        459    Interpretive Statements  INCOMPLETE ANALYSIS DUE TO MISSING DATA IN PRECORDIAL LEAD(S)  SINUS RHYTHM  VENTRICULAR PREMATURE COMPLEX  RSR' IN V1 OR V2, PROBABLY NORMAL VARIANT  MISSING LEAD(S): V1  No previous ECG available for comparison         Imaging  CT-CTA NECK WITH & W/O-POST PROCESSING   Final Result         1.  Hypoplastic left vertebral artery, otherwise CT angiogram of the neck within normal limits.      CT-CTA HEAD WITH & W/O-POST PROCESS   Final Result         1.  Distal right M1 occlusion   2.  Hypoplastic left A1   3.  Persistent fetal morphology of the right posterior cerebral artery.      These findings were discussed with the patient's clinician, MIKEY ONEAL, on 2019 11:23 PM.      CT-CEREBRAL PERFUSION ANALYSIS   Final Result         1.  Cerebral blood flow less than 30% likely representing completed infarct = 50 mL.      2.  T Max more than 6 seconds likely representing combination of completed infarct and ischemia = 99 mL.      3.  Mismatched volume likely representing ischemic brain/penumbra = 49 mL      4.  Please note that the cerebral perfusion was performed on the limited brain tissue around the basal ganglia region. Infarct/ischemia outside the CT perfusion sections can  be missed in this study.      CT-HEAD W/O   Final Result         1.  No acute intracranial abnormality is identified, there are nonspecific white matter changes, commonly associated with small vessel ischemic disease.  Associated mild cerebral atrophy is noted.      DX-CHEST-PORTABLE (1 VIEW)    (Results Pending)   IR-CONSULT AND TREAT    (Results Pending)       Assessment/Plan  Stroke (HCC)- (present on admission)   Assessment & Plan    Acute right M1 occlusion  Status post TPA initiated at 23:48 on 6/9  Plan for IR thrombectomy  Post TPA protocols  Every hour neurochecks  Strict BP parameters to maintain less than 180/105 pending IR intervention which may require lower parameters  No antiplatelet or anticoagulant therapy in the first 24 hours  MRI of the brain  Repeat CT head 24 hours post TPA, sooner if any neurologic deterioration  Aspiration precautions  Secondary stroke work-up- A1c, lipid panel, echo, cardiac monitoring  PT/OT/speech     Hypertension- (present on admission)   Assessment & Plan    Reported history of, uncertain of any home medications  At this time will be under TPA protocol as indicated above       Addendum  Patient has status post IR thrombectomy for which reported his right M2 occlusion and restoration of TICI 2B flow.  She is regaining function on the left from dense hemiplegia to now 4+ out of 5 motor function in upper and lower left sided extremity      Discussed patient condition and risk of morbidity and/or mortality with Hospitalist, RN and Patient.    The patient remains critically ill.  Critical care time = 35 minutes in directly providing and coordinating critical care and extensive data review.  No time overlap and excludes procedures.

## 2019-06-10 NOTE — OR SURGEON
Immediate Post- Operative Note        PostOp Diagnosis: RIGHT M2 occlusion      Procedure(s): arteriography, stroke thrombectomy, TICI 2B result      Estimated Blood Loss: Less than 5 ml        Complications: None            6/10/2019     1:30 AM     Irving Armstrong

## 2019-06-10 NOTE — PROGRESS NOTES
Hospital Medicine Daily Progress Note    Date of Service  6/10/2019    Chief Complaint  Left side weakness and aphasia    Hospital Course    72yo Croatian-speaking F (Adrienne Jordan from [facility]) admitted 6/9/2019 with left side weakness and aphasia at 2200 6/9.  TPA was given at 23:45 and patient was taken for mechanical thrombectomy.        Interval Problem Update  S/p trombectomy   Replace Mg  UOP adequate via bedpan  Bedrest  Family at bedside  Patient conversant full sentence speech  Able to follow commands    Consultants/Specialty  Neurology, Dr Steen  Pulmonary    Code Status  FULL    Disposition  Monitor in ICU until 24 hr post TPA    Review of Systems  Review of Systems   Unable to perform ROS: Language   Constitutional: Negative for chills and fever.   HENT: Negative for sore throat.    Eyes: Negative for double vision.   Respiratory: Negative for shortness of breath.    Cardiovascular: Negative for chest pain and leg swelling.   Genitourinary: Negative for dysuria.   Musculoskeletal: Negative for back pain and neck pain.   Neurological: Negative for dizziness, sensory change, focal weakness and headaches.        Physical Exam  Temp:  [36.4 °C (97.5 °F)-37.2 °C (98.9 °F)] 36.9 °C (98.4 °F)  Pulse:  [57-87] 74  Resp:  [10-56] 36  BP: (135-139)/(58-72) 135/58  SpO2:  [90 %-100 %] 93 %    Physical Exam   Constitutional: She appears well-developed. No distress.   HENT:   Head: Normocephalic and atraumatic.   Nose: Nose normal.   Mouth/Throat: No oropharyngeal exudate.   Eyes: Conjunctivae and EOM are normal. Right eye exhibits no discharge. Left eye exhibits no discharge. No scleral icterus.   Neck: No tracheal deviation present.   Cardiovascular: Normal rate and normal heart sounds.    No murmur heard.  Pulses:       Radial pulses are 2+ on the right side, and 2+ on the left side.        Dorsalis pedis pulses are 2+ on the right side, and 2+ on the left side.   Pulmonary/Chest: Effort normal and breath  sounds normal. No stridor. No respiratory distress. She has no wheezes.   Abdominal: Soft. Bowel sounds are normal. She exhibits no distension. There is no tenderness.   Musculoskeletal: She exhibits no edema.   Lymphadenopathy:     She has no cervical adenopathy.   Neurological: She is alert. No cranial nerve deficit.   Questionable slighter weakness in left hand  compared to right. Close to 5/5 strength   Skin: Skin is warm. She is not diaphoretic.   Psychiatric: She has a normal mood and affect.   Vitals reviewed.      Fluids    Intake/Output Summary (Last 24 hours) at 06/10/19 1518  Last data filed at 06/10/19 1400   Gross per 24 hour   Intake            957.5 ml   Output              800 ml   Net            157.5 ml       Laboratory  Recent Labs      06/09/19   2302  06/10/19   0420   WBC  7.5  7.2   RBC  4.49  4.20   HEMOGLOBIN  12.9  12.1   HEMATOCRIT  40.2  38.1   MCV  89.5  90.7   MCH  28.7  28.8   MCHC  32.1*  31.8*   RDW  43.1  44.8   PLATELETCT  478*  412   MPV  10.1  9.9     Recent Labs      06/09/19   2302  06/10/19   0420   SODIUM  138  137   POTASSIUM  4.3  4.1   CHLORIDE  103  107   CO2  25  22   GLUCOSE  111*  151*   BUN  18  16   CREATININE  1.16  1.07   CALCIUM  9.6  8.3*     Recent Labs      06/09/19 2302   APTT  24.2*   INR  1.12         Recent Labs      06/09/19 2302   TRIGLYCERIDE  158*   HDL  48   LDL  112*       Imaging  DX-CHEST-PORTABLE (1 VIEW)   Final Result         1.  Interstitial bilateral pulmonary opacities suggests interstitial edema and/or infiltrate.   2.  Atherosclerosis      CT-CTA NECK WITH & W/O-POST PROCESSING   Final Result         1.  Hypoplastic left vertebral artery, otherwise CT angiogram of the neck within normal limits.      CT-CTA HEAD WITH & W/O-POST PROCESS   Final Result         1.  Distal right M1 occlusion   2.  Hypoplastic left A1   3.  Persistent fetal morphology of the right posterior cerebral artery.      These findings were discussed with the  patient's clinician, MIKEY ONEAL, on 6/9/2019 11:23 PM.      CT-CEREBRAL PERFUSION ANALYSIS   Final Result         1.  Cerebral blood flow less than 30% likely representing completed infarct = 50 mL.      2.  T Max more than 6 seconds likely representing combination of completed infarct and ischemia = 99 mL.      3.  Mismatched volume likely representing ischemic brain/penumbra = 49 mL      4.  Please note that the cerebral perfusion was performed on the limited brain tissue around the basal ganglia region. Infarct/ischemia outside the CT perfusion sections can be missed in this study.      CT-HEAD W/O   Final Result         1.  No acute intracranial abnormality is identified, there are nonspecific white matter changes, commonly associated with small vessel ischemic disease.  Associated mild cerebral atrophy is noted.      IR-THROMBO MECHANICAL ARTERY,INIT    (Results Pending)   MR-BRAIN-WITH & W/O    (Results Pending)        Assessment/Plan  Stroke (HCC)- (present on admission)   Assessment & Plan    Status post TPA at 11:45 PM on 6/9/2019  CTA head with IV contrast reveals distal right M1 occlusion, patient will be taken to IR for mechanical thrombectomy  Patient will be admitted to the ICU with neurochecks per TPA protocol  Await MRI and echocardiogram  Initiating aspirin after 24 hours from TPA and initiated on high intensity atorvastatin 40 mg  Neurology consulting  PT/OT/ST  Normal TSH  Monitoring on telemetry for any signs of arrhythmia         Dyslipidemia   Assessment & Plan    Elevated triglycerides and LDL  Initiated on high intensity statin  6/10 glycohemoglobin: 6.3     Hypertension- (present on admission)   Assessment & Plan    S/P Mechanical thrombectomy will have blood pressure not to exceed a systolic blood pressure greater than 160  We will adjust PRN medications as such  Monitoring vitals            VTE prophylaxis: SCD's

## 2019-06-10 NOTE — H&P
Hospital Medicine History & Physical Note    Date of Service  6/10/2019    Primary Care Physician  No primary care provider on file.    Consultants  Neurology  IR    Code Status  Full Code    Chief Complaint  Left sided weakness and aphasia    History of Presenting Illness  89 y.o. unknown who presented 6/9/2019 with sudden onset of left sided weakness and aphasia at 10 pm today. History is limited and is obtained from patient's son at bedside. Apparently the patient was in her normal state of health until she was found slumped with symptoms of left-sided weakness and aphasia.  There is no reported trauma, seizures, fevers or chills.  Son is unsure of her medical history or what medications she is on.  There is no history of stroke.  The patient does not smoke or drink alcohol.  Patient is unable to participate in a history due to aphasia.    The case was discussed with telemetry stroke team, TPA was recommended and administered at 11:45 PM.  IR was consulted and the patient will be taken for mechanical thrombectomy.    Chest x-ray interpreted by me reveals mild interstitial edema  EKG interpreted by me reveals sinus rhythm with PVC.  No ST elevation or ST depression noted    Review of Systems  Review of Systems   Unable to perform ROS: Medical condition (Stroke/aphasia)       Past Medical History  Unable to obtain at this time    Surgical History  Unable to obtain at this time    Family History  Unable to obtain at this time    Social History   Denies tobacco or alcohol use    Allergies  No known allergies    Medications  None       Physical Exam  Pulse:  [76] 76  BP: (135)/(58) 135/58  SpO2:  [97 %] 97 %    Physical Exam   Constitutional: He appears well-developed and well-nourished.   HENT:   Head: Normocephalic and atraumatic.   Mouth/Throat: Oropharynx is clear and moist.   Eyes: Pupils are equal, round, and reactive to light. Conjunctivae are normal. Right eye exhibits no discharge. Left eye exhibits no  discharge. No scleral icterus.   Neck: Neck supple.   Cardiovascular: Normal rate, regular rhythm and normal heart sounds.    Pulmonary/Chest: Effort normal and breath sounds normal. No stridor. No respiratory distress. He has no wheezes. He has no rales.   Abdominal: Soft. Bowel sounds are normal. He exhibits no distension. There is no tenderness. There is no rebound and no guarding.   Musculoskeletal: Normal range of motion. He exhibits no edema, tenderness or deformity.   Lymphadenopathy:     He has no cervical adenopathy.   Neurological: No cranial nerve deficit. Coordination normal.   Somnolent  Aphasic  Dense left hemiparesis  Follows commands  Right-sided strength intact     Skin: Skin is warm and dry. No rash noted. He is not diaphoretic. No erythema.   Psychiatric:   Unable to assess   Nursing note and vitals reviewed.      Laboratory:  Recent Labs      06/09/19 2302   WBC  7.5   RBC  4.49*   HEMOGLOBIN  12.9*   HEMATOCRIT  40.2*   MCV  89.5   MCH  28.7   MCHC  32.1*   RDW  43.1   PLATELETCT  478*   MPV  10.1     Recent Labs      06/09/19   2302   SODIUM  138   POTASSIUM  4.3   CHLORIDE  103   CO2  25   GLUCOSE  111*   BUN  18   CREATININE  1.16   CALCIUM  9.6     Recent Labs      06/09/19   2302   ALTSGPT  23   ASTSGOT  33   ALKPHOSPHAT  69   TBILIRUBIN  0.3   GLUCOSE  111*     Recent Labs      06/09/19 2302   APTT  24.2*   INR  1.12             Recent Labs      06/09/19 2302   TROPONINI  <0.01       Urinalysis:    No results found     Imaging:  DX-CHEST-PORTABLE (1 VIEW)   Final Result         1.  Interstitial bilateral pulmonary opacities suggests interstitial edema and/or infiltrate.   2.  Atherosclerosis      CT-CTA NECK WITH & W/O-POST PROCESSING   Final Result         1.  Hypoplastic left vertebral artery, otherwise CT angiogram of the neck within normal limits.      CT-CTA HEAD WITH & W/O-POST PROCESS   Final Result         1.  Distal right M1 occlusion   2.  Hypoplastic left A1   3.   Persistent fetal morphology of the right posterior cerebral artery.      These findings were discussed with the patient's clinician, MIKEY ONEAL, on 6/9/2019 11:23 PM.      CT-CEREBRAL PERFUSION ANALYSIS   Final Result         1.  Cerebral blood flow less than 30% likely representing completed infarct = 50 mL.      2.  T Max more than 6 seconds likely representing combination of completed infarct and ischemia = 99 mL.      3.  Mismatched volume likely representing ischemic brain/penumbra = 49 mL      4.  Please note that the cerebral perfusion was performed on the limited brain tissue around the basal ganglia region. Infarct/ischemia outside the CT perfusion sections can be missed in this study.      CT-HEAD W/O   Final Result         1.  No acute intracranial abnormality is identified, there are nonspecific white matter changes, commonly associated with small vessel ischemic disease.  Associated mild cerebral atrophy is noted.      IR-CONSULT AND TREAT    (Results Pending)         Assessment/Plan:  I anticipate this patient will require at least two midnights for appropriate medical management, necessitating inpatient admission.    Stroke (HCC)- (present on admission)   Assessment & Plan    Status post TPA at 11:45 PM on 6/9/2019  CTA head with IV contrast reveals distal right M1 occlusion, patient will be taken to IR for mechanical thrombectomy  Patient will be admitted to the ICU with neurochecks per TPA protocol  Patient will be placed on continuous cardiac monitoring to evaluate for any arrhythmias  I will order MRI brain for further evaluation  Check 2-D echo  Patient will be started on full dose aspirin 24 hours after TPA administration  Patient is a started on high intensity atorvastatin  Strict blood pressure control with IV hydralazine and labetalol for SBP greater than 180/105  IV fluid hydration with normal saline  Check TSH, lipid panel and hemoglobin A1c  PTOT and ST evaluation  Neurology  consulted           Hypertension- (present on admission)   Assessment & Plan    Allow permissive hypertension per TPA protocol  IV hydralazine and labetalol for SBP greater than 180 and DBP greater than 105         VTE prophylaxis: scd

## 2019-06-10 NOTE — ASSESSMENT & PLAN NOTE
SBP goal less than 140 mmHg  DBP goal less than 90 mmHg  PRN and antihypertensives to titrate by hospitalist towards goal  Most recent Blood Pressure : 127/84

## 2019-06-10 NOTE — ED PROVIDER NOTES
"ED Provider Note    Scribed for Madhu Osei M.D. by Madhu Osei. 6/9/2019,  11:00 PM.    CHIEF COMPLAINT  Chief Complaint   Patient presents with   • Possible Stroke       HPI  Kernel [age] is an elderly Montserratian-speaking female who presents to the Emergency Department brought in by her daughter for sudden onset of left-sided weakness, and loss of the ability to speak, approximately 1 hour ago while getting ready for bed.  The daughter reported that she was in her normal state of health, then slumped over as they were preparing her for bed.  There was no trauma.  She has not had any recent illness including fevers or chills or nausea or vomiting or chest pain or shortness of breath, according to her daughter.  Her daughter is the only historian, since the patient is currently nonverbal and cannot contribute to her history, even in her native language.  Her daughter thinks that she has some kind of a heart problem, but does not know exactly what kind, or what medications she takes.  She reports that her mother came to live with her 1 month ago, from [facility], which is why she does not have much detail on her medical history.    This patient's history and review of systems and physical is severely complicated by her inability to participate.  She has obvious left-sided hemineglect,      REVIEW OF SYSTEMS  See HPI for further details. All other systems are negative.     PAST MEDICAL HISTORY   unspecified \"heart problem,\" otherwise unknown    SOCIAL HISTORY  Social History     Social History Main Topics   • Smoking status: Never Smoker   • Smokeless tobacco: Never Used   • Alcohol use No   • Drug use: No   • Sexual activity: Not on file     History   Drug Use No       SURGICAL HISTORY  patient denies any surgical history    CURRENT MEDICATIONS  Home Medications    **Home medications have not yet been reviewed for this encounter**         ALLERGIES  No Known Allergies    PHYSICAL EXAM  VITAL SIGNS: BP " "135/58   Pulse 75   Temp 36.9 °C (98.4 °F) (Temporal)   Resp 26   Ht 1.575 m (5' 2\")   Wt 57.6 kg (126 lb 15.8 oz)   SpO2 93%   BMI 23.23 kg/m²   Pulse ox interpretation: I interpret this pulse ox as normal.  Constitutional: Stuporous.  HENT: No signs of trauma, Bilateral external ears normal, Nose normal.   Eyes: Conjunctiva normal, Non-icteric.   Neck: Normal range of motion, Supple, No stridor.   Lymphatic: No lymphadenopathy noted.   Cardiovascular: Regular rate and rhythm, no murmurs.   Thorax & Lungs: Normal breath sounds, No respiratory distress, No wheezing, No chest tenderness.   Abdomen: Bowel sounds normal, Soft, No tenderness, No masses, No pulsatile masses. No peritoneal signs.  Skin: Warm, Dry, No erythema, No rash.   Extremities: Intact distal pulses, No edema, No cyanosis.  Musculoskeletal: Good range of motion in all major joints. No or major deformities noted.   Neurologic: Stuporous, left upper and lower extremities are flaccid.  Left-sided hemineglect, rightward gaze preference.  Complete aphasia.  Psychiatric: Unable to assess due to complete aphasia.    DIAGNOSTIC STUDIES / PROCEDURES    EKG #1:  Interpreted by me    Rhythm:  Normal sinus rhythm   Rate: 76  VENTRICULAR PREMATURE COMPLEX   RSR' IN V1 OR V2, PROBABLY NORMAL VARIANT   MISSING LEAD(S): V1   No previous ECG available for comparison           LABS  Labs Reviewed   CBC WITH DIFFERENTIAL - Abnormal; Notable for the following:        Result Value    RBC 4.49 (*)     Hemoglobin 12.9 (*)     Hematocrit 40.2 (*)     MCHC 32.1 (*)     Platelet Count 478 (*)     Neutrophils-Polys 34.00 (*)     Lymphocytes 55.90 (*)     All other components within normal limits    Narrative:     Indicate which anticoagulants the patient is on:->UNKNOWN   COMP METABOLIC PANEL - Abnormal; Notable for the following:     Glucose 111 (*)     All other components within normal limits    Narrative:     Indicate which anticoagulants the patient is on:->UNKNOWN "   PROTHROMBIN TIME - Abnormal; Notable for the following:     PT 14.7 (*)     All other components within normal limits    Narrative:     Indicate which anticoagulants the patient is on:->UNKNOWN   APTT - Abnormal; Notable for the following:     APTT 24.2 (*)     All other components within normal limits    Narrative:     Indicate which anticoagulants the patient is on:->UNKNOWN   ESTIMATED GFR - Abnormal; Notable for the following:     GFR If Non  56 (*)     All other components within normal limits    Narrative:     Indicate which anticoagulants the patient is on:->UNKNOWN   LIPID PROFILE - Abnormal; Notable for the following:     Triglycerides 158 (*)      (*)     All other components within normal limits    Narrative:     Indicate which anticoagulants the patient is on:->UNKNOWN   BASIC METABOLIC PANEL - Abnormal; Notable for the following:     Glucose 151 (*)     Calcium 8.3 (*)     All other components within normal limits   CBC WITH DIFFERENTIAL - Abnormal; Notable for the following:     RBC 4.20 (*)     Hemoglobin 12.1 (*)     Hematocrit 38.1 (*)     MCHC 31.8 (*)     Neutrophils-Polys 80.30 (*)     Lymphocytes 15.20 (*)     All other components within normal limits   ACCU-CHEK GLUCOSE - Abnormal; Notable for the following:     Glucose - Accu-Ck 112 (*)     All other components within normal limits   COD (ADULT)   TROPONIN    Narrative:     Indicate which anticoagulants the patient is on:->UNKNOWN   TSH WITH REFLEX TO FT4   MAGNESIUM    Narrative:     Indicate which anticoagulants the patient is on:->UNKNOWN   HEMOGLOBIN A1C   MAGNESIUM   ESTIMATED GFR   URINALYSIS,CULTURE IF INDICATED   ABO RH CONFIRM   HEMOGLOBIN A1C   MAGNESIUM     All labs reviewed by me.    RADIOLOGY  DX-CHEST-PORTABLE (1 VIEW)   Final Result         1.  Interstitial bilateral pulmonary opacities suggests interstitial edema and/or infiltrate.   2.  Atherosclerosis      CT-CTA NECK WITH & W/O-POST PROCESSING    Final Result         1.  Hypoplastic left vertebral artery, otherwise CT angiogram of the neck within normal limits.      CT-CTA HEAD WITH & W/O-POST PROCESS   Final Result         1.  Distal right M1 occlusion   2.  Hypoplastic left A1   3.  Persistent fetal morphology of the right posterior cerebral artery.      These findings were discussed with the patient's clinician, MIKEY ONEAL, on 6/9/2019 11:23 PM.      CT-CEREBRAL PERFUSION ANALYSIS   Final Result         1.  Cerebral blood flow less than 30% likely representing completed infarct = 50 mL.      2.  T Max more than 6 seconds likely representing combination of completed infarct and ischemia = 99 mL.      3.  Mismatched volume likely representing ischemic brain/penumbra = 49 mL      4.  Please note that the cerebral perfusion was performed on the limited brain tissue around the basal ganglia region. Infarct/ischemia outside the CT perfusion sections can be missed in this study.      CT-HEAD W/O   Final Result         1.  No acute intracranial abnormality is identified, there are nonspecific white matter changes, commonly associated with small vessel ischemic disease.  Associated mild cerebral atrophy is noted.      IR-THROMBO MECHANICAL ARTERY,INIT    (Results Pending)   MR-BRAIN-WITH & W/O    (Results Pending)     The radiologist's interpretation of all radiological studies have been reviewed by me.    COURSE & MEDICAL DECISION MAKING  Nursing notes, VS, PMSFHx reviewed in chart.     11:00 PM Patient seen and examined at bedside. Differential diagnosis includes but is not limited to hemorrhagic or ischemic stroke, stroke mimic such as any plegic migraine, seizure activity. Ordered for immediate implementation of the stroke protocol to evaluate.     11:05 PM I spoke with neurology and we will set up the telestroke system.     11:25 PM the patient is back from CT.  The  phone and stroke robot are being connected so that she can receive a  formal neurology evaluation.  I received a phone just a moment ago from the reading radiologist, and the patient has an M1 occlusion, with a significant perfusion mismatch, and that may be amenable to     11:34 PM Telestroke evaluation is in process. PT will be consented for TPA, and the interventional team is being consulted for possible retrieval.     11:35 PM Pharmacy is preparing TPA.     11:45 TPA infusing    12:07 AM initial interventional radiology orders have been placed.  I have paged and spoken with the intensive care physician, and the admitting hospitalist.    12:22 AM This patient is off the floor, en route to IR.    DISPOSITION:  Patient will be admitted to the ICU in critical condition.      FINAL IMPRESSION  1. Ischemic stroke  2. Hemiparesis  3. Aphasia    Patient is critically ill.   The patient continues to have: An ischemic stroke with a large M1 occlusion.  The vital organ system that is affected is the: Brain.  If untreated there is a high chance of deterioration into: Completed stroke with brain tissue infarct and hemorrhagic conversion,  And eventually death.   The critical care that I am providing today is: Immediate implementation of the stroke protocol, frequent re-evaluations of the patient, consultation with neurology, radiology, interventional neurology, critical care, and the hospitalist service, as well as obtaining collateral information from the patient's son and daughter, and coordinating administration of TPA with the ED pharmacist, and coordinating interventional radiology clot retrieval.  The critical that has been undertaken is medically complex.   There has been no overlap in critical care time.   Critical Care Time not including procedures: 75

## 2019-06-10 NOTE — CARE PLAN
Problem: Infection  Goal: Will remain free from infection  Outcome: PROGRESSING AS EXPECTED  Family educated on hand hygiene. Patient monitored for s/s infection. Standard precautions in place.     Problem: Venous Thromboembolism (VTW)/Deep Vein Thrombosis (DVT) Prevention:  Goal: Patient will participate in Venous Thrombosis (VTE)/Deep Vein Thrombosis (DVT)Prevention Measures  Outcome: PROGRESSING AS EXPECTED  Bilateral calf SCDs in place. Pharmacologic prophylaxis contraindicated at this time.     Problem: Knowledge Deficit  Goal: Knowledge of disease process/condition, treatment plan, diagnostic tests, and medications will improve  Outcome: PROGRESSING AS EXPECTED  Video  used with RN and neurologist to educate patient on disease process, plan of care, interventions, diagnostic tests. Questions addressed.     Problem: Acute Care of the Stroke Patient  Goal: Optimal Outcome for the Stroke patient  Outcome: PROGRESSING AS EXPECTED  Vital signs and neuro checks completed and documented per order/protocol. NIHSS completed and documented. MRI planned.

## 2019-06-10 NOTE — THERAPY
"Speech Language Therapy Clinical Swallow Evaluation completed.    Functional Status: Pt was AAOx3, with confusion to day only, eager to participate.  Pt noted to have a right sided facial droop and moderate dysarthria, however family bedside reports her speech is much improved as compared to last night.  Pt's voice was reduced in volume, but cough was strong. She consumed PO trials of ice chips, nectars, puree, soft solids and thin liquids.  Pt had no overt s/sx of aspiration with ice chips, nectars or puree.  Pt had an increase in wet vocal quality with trials of thins, concerning for possible silent aspiration or penetration.  Pt is edentulous and without dentures currently, thus had prolonged and ineffective mastication with small amounts of soft solids  She had anterior loss of bolus on the left with soft solids and liquids, without recognition, indicating lack of sensation.  Pt needed 1:1 assistance with feeding 2/2 LUE weakness.  Pt had tPA at 11pm on 6/9/19.  She is not a candidate for a FEES until tonight at 11pm, although she may benefit from one later.  She appears to be at the level to start a NTFL diet with 1:1 feeding assistance.  SLP is following closely.      Recommendations - Diet:  Nectar Thick Full Liquid                          Strategies: Strict 1:1 feeding , No Straws and Head of Bed at 90 Degrees                          Medication Administration: Crush all Medications in Puree    Plan of Care: Will benefit from Speech Therapy 5 times per week    Post-Acute Therapy: Recommend inpatient transitional care services for continued speech therapy services.      See \"Rehab Therapy-Acute\" Patient Summary Report for complete documentation. Thank you for the consult.  "

## 2019-06-10 NOTE — ED TRIAGE NOTES
Chief Complaint   Patient presents with   • Possible Stroke     Pt from lobby for above complaint. Pt presented with L sided paralysis from lobby starting around 2200 per family. Pt is unable to speak to us with noted L sided deficits throughout. Pt to CT and to R6. Report to RN for R6

## 2019-06-11 ENCOUNTER — APPOINTMENT (OUTPATIENT)
Dept: RADIOLOGY | Facility: MEDICAL CENTER | Age: 74
DRG: 023 | End: 2019-06-11
Attending: INTERNAL MEDICINE

## 2019-06-11 ENCOUNTER — APPOINTMENT (OUTPATIENT)
Dept: RADIOLOGY | Facility: MEDICAL CENTER | Age: 74
DRG: 023 | End: 2019-06-11
Attending: PSYCHIATRY & NEUROLOGY

## 2019-06-11 LAB
ANION GAP SERPL CALC-SCNC: 9 MMOL/L (ref 0–11.9)
BASOPHILS # BLD AUTO: 0.5 % (ref 0–1.8)
BASOPHILS # BLD: 0.03 K/UL (ref 0–0.12)
BUN SERPL-MCNC: 14 MG/DL (ref 8–22)
CALCIUM SERPL-MCNC: 9.1 MG/DL (ref 8.5–10.5)
CHLORIDE SERPL-SCNC: 105 MMOL/L (ref 96–112)
CO2 SERPL-SCNC: 23 MMOL/L (ref 20–33)
CREAT SERPL-MCNC: 1.11 MG/DL (ref 0.5–1.4)
EOSINOPHIL # BLD AUTO: 0.05 K/UL (ref 0–0.51)
EOSINOPHIL NFR BLD: 0.8 % (ref 0–6.9)
ERYTHROCYTE [DISTWIDTH] IN BLOOD BY AUTOMATED COUNT: 43.8 FL (ref 35.9–50)
GLUCOSE SERPL-MCNC: 92 MG/DL (ref 65–99)
HCT VFR BLD AUTO: 37.6 % (ref 37–47)
HGB BLD-MCNC: 12.2 G/DL (ref 12–16)
IMM GRANULOCYTES # BLD AUTO: 0.02 K/UL (ref 0–0.11)
IMM GRANULOCYTES NFR BLD AUTO: 0.3 % (ref 0–0.9)
LYMPHOCYTES # BLD AUTO: 2.49 K/UL (ref 1–4.8)
LYMPHOCYTES NFR BLD: 38.2 % (ref 22–41)
MCH RBC QN AUTO: 28.6 PG (ref 27–33)
MCHC RBC AUTO-ENTMCNC: 32.4 G/DL (ref 33.6–35)
MCV RBC AUTO: 88.3 FL (ref 81.4–97.8)
MONOCYTES # BLD AUTO: 0.51 K/UL (ref 0–0.85)
MONOCYTES NFR BLD AUTO: 7.8 % (ref 0–13.4)
NEUTROPHILS # BLD AUTO: 3.42 K/UL (ref 2–7.15)
NEUTROPHILS NFR BLD: 52.4 % (ref 44–72)
NRBC # BLD AUTO: 0 K/UL
NRBC BLD-RTO: 0 /100 WBC
PLATELET # BLD AUTO: 412 K/UL (ref 164–446)
PMV BLD AUTO: 10 FL (ref 9–12.9)
POTASSIUM SERPL-SCNC: 4 MMOL/L (ref 3.6–5.5)
RBC # BLD AUTO: 4.26 M/UL (ref 4.2–5.4)
SODIUM SERPL-SCNC: 137 MMOL/L (ref 135–145)
WBC # BLD AUTO: 6.5 K/UL (ref 4.8–10.8)

## 2019-06-11 PROCEDURE — 80048 BASIC METABOLIC PNL TOTAL CA: CPT

## 2019-06-11 PROCEDURE — 99233 SBSQ HOSP IP/OBS HIGH 50: CPT | Performed by: HOSPITALIST

## 2019-06-11 PROCEDURE — A9270 NON-COVERED ITEM OR SERVICE: HCPCS | Performed by: INTERNAL MEDICINE

## 2019-06-11 PROCEDURE — 70551 MRI BRAIN STEM W/O DYE: CPT

## 2019-06-11 PROCEDURE — 700102 HCHG RX REV CODE 250 W/ 637 OVERRIDE(OP): Performed by: INTERNAL MEDICINE

## 2019-06-11 PROCEDURE — 700102 HCHG RX REV CODE 250 W/ 637 OVERRIDE(OP): Performed by: HOSPITALIST

## 2019-06-11 PROCEDURE — 99233 SBSQ HOSP IP/OBS HIGH 50: CPT | Performed by: PSYCHIATRY & NEUROLOGY

## 2019-06-11 PROCEDURE — 70450 CT HEAD/BRAIN W/O DYE: CPT

## 2019-06-11 PROCEDURE — 92526 ORAL FUNCTION THERAPY: CPT

## 2019-06-11 PROCEDURE — 97166 OT EVAL MOD COMPLEX 45 MIN: CPT

## 2019-06-11 PROCEDURE — 770001 HCHG ROOM/CARE - MED/SURG/GYN PRIV*

## 2019-06-11 PROCEDURE — 85025 COMPLETE CBC W/AUTO DIFF WBC: CPT

## 2019-06-11 PROCEDURE — A9270 NON-COVERED ITEM OR SERVICE: HCPCS | Performed by: HOSPITALIST

## 2019-06-11 PROCEDURE — 97162 PT EVAL MOD COMPLEX 30 MIN: CPT

## 2019-06-11 RX ADMIN — ATORVASTATIN CALCIUM 40 MG: 40 TABLET, FILM COATED ORAL at 17:19

## 2019-06-11 RX ADMIN — ASPIRIN 325 MG: 325 TABLET, FILM COATED ORAL at 01:17

## 2019-06-11 RX ADMIN — ACETAMINOPHEN 650 MG: 325 TABLET, FILM COATED ORAL at 08:04

## 2019-06-11 RX ADMIN — ACETAMINOPHEN 650 MG: 325 TABLET, FILM COATED ORAL at 17:19

## 2019-06-11 ASSESSMENT — GAIT ASSESSMENTS
DISTANCE (FEET): 40
GAIT LEVEL OF ASSIST: MINIMAL ASSIST
DEVIATION: SHUFFLED GAIT;DECREASED HEEL STRIKE;DECREASED TOE OFF;OTHER (COMMENT)

## 2019-06-11 ASSESSMENT — COGNITIVE AND FUNCTIONAL STATUS - GENERAL
MOVING TO AND FROM BED TO CHAIR: A LITTLE
EATING MEALS: A LITTLE
SUGGESTED CMS G CODE MODIFIER DAILY ACTIVITY: CK
HELP NEEDED FOR BATHING: A LITTLE
DRESSING REGULAR LOWER BODY CLOTHING: A LITTLE
SUGGESTED CMS G CODE MODIFIER MOBILITY: CK
WALKING IN HOSPITAL ROOM: A LITTLE
TURNING FROM BACK TO SIDE WHILE IN FLAT BAD: A LITTLE
MOVING FROM LYING ON BACK TO SITTING ON SIDE OF FLAT BED: A LITTLE
MOBILITY SCORE: 18
DAILY ACTIVITIY SCORE: 18
TOILETING: A LITTLE
STANDING UP FROM CHAIR USING ARMS: A LITTLE
DRESSING REGULAR UPPER BODY CLOTHING: A LITTLE
CLIMB 3 TO 5 STEPS WITH RAILING: A LITTLE
PERSONAL GROOMING: A LITTLE

## 2019-06-11 ASSESSMENT — ENCOUNTER SYMPTOMS
FOCAL WEAKNESS: 1
NAUSEA: 0
NECK PAIN: 0
HEARTBURN: 0
MUSCULOSKELETAL NEGATIVE: 1
DOUBLE VISION: 0
FEVER: 0
BLURRED VISION: 0
SORE THROAT: 0
PALPITATIONS: 0
COUGH: 0
WEAKNESS: 0
VOMITING: 0
EYES NEGATIVE: 1
GASTROINTESTINAL NEGATIVE: 1
CARDIOVASCULAR NEGATIVE: 1
SHORTNESS OF BREATH: 0
SPEECH CHANGE: 1
BRUISES/BLEEDS EASILY: 0
NERVOUS/ANXIOUS: 1
DIZZINESS: 0
SENSORY CHANGE: 0
DEPRESSION: 0
BACK PAIN: 0
POLYDIPSIA: 0
RESPIRATORY NEGATIVE: 1
CHILLS: 0
CONSTITUTIONAL NEGATIVE: 1
HEADACHES: 1

## 2019-06-11 ASSESSMENT — ACTIVITIES OF DAILY LIVING (ADL): TOILETING: INDEPENDENT

## 2019-06-11 NOTE — CARE PLAN
Problem: Pain Management  Goal: Pain level will decrease to patient's comfort goal  Outcome: PROGRESSING AS EXPECTED  Patient c/o headache, well controlled with PRN tylenol and heat packs.    Problem: Acute Care of the Stroke Patient  Goal: Optimal Outcome for the Stroke patient  Outcome: PROGRESSING AS EXPECTED  VS and neuro checks per MD order and protocol, follow up imaging completed, labs monitored.

## 2019-06-11 NOTE — THERAPY
"Physical Therapy Evaluation completed.   Bed Mobility:  Supine to Sit: Supervised  Transfers: Sit to Stand: Minimal Assist (for safety)  Gait: Level Of Assist: Minimal Assist with No Equipment Needed       Plan of Care: Will benefit from Physical Therapy 3 times per week  Discharge Recommendations: Equipment: Will Continue to Assess for Equipment Needs. Post-acute therapy: see below.    See \"Rehab Therapy-Acute\" Patient Summary Report for complete documentation.   Patient is a 72 YO female that was admitted 6/9 following stroke like symptoms including LUE weakness and expressive aphasia. Imaging revealed R MCA occlusion and resulting infarction. PMHx significant for prior CVA. Patient is Ni[place]an speaking and interpretor line used, family also attempting to translate and interpretor reported they were providing inaccurate translation. She presented to PT with impaired sensation and impaired balance though appears as though she might be near functional mobility baseline. She reported no AD at baseline but relies on walls/furniture in home. She ambulated approximately 40ft with no AD. Anticipate patient will be able to DC home with 24/7 supervision. Will continue to follow during acute stay.  "

## 2019-06-11 NOTE — THERAPY
"Speech Language Therapy dysphagia treatment completed.     Functional Status:  Pt was seen for dysphagia tx this date, with a NTFL meal tray.  Pt was AAOx4, voice was strong and clear and only mild dysarthria was noted, which is an improvement as compared to initial swallow evaluation.  Pt consumed purees and nectars without any overt s/sx of aspiration.  Swallow trigger was timely and voice remained clear throughout meal.  Pt self fed without difficulty, however required cueing to slow pace and decrease bite size.  Pt declining any further PO trials today.  Recommend to continue a NTFL diet with monitoring and assistance for set up.  SLP continues to follow.      Recommendations:  continue a NTFL diet with monitoring and assistance for set up     Plan of Care: Will benefit from Speech Therapy 5 times per week    Post-Acute Therapy: Recommend inpatient transitional care services OR home health transitional care services for continued speech therapy services.      See \"Rehab Therapy-Acute\" Patient Summary Report for complete documentation.     "

## 2019-06-11 NOTE — PROGRESS NOTES
Hospital Medicine Daily Progress Note    Date of Service  6/11/2019    Chief Complaint  Left side weakness and aphasia    Hospital Course    72yo Tajik-speaking F (Adrienne Jordan from [facility]) admitted 6/9/2019 with left side weakness and aphasia at 2200 6/9.  TPA was given at 23:45 and patient was taken for mechanical thrombectomy.        Interval Problem Update  Patient seen and examined today. ICU Care  Care and plan discussed in IDT/Hot rounds.  Lines and assistive devices reviewed.    Patient tolerating treatment and therapies.  All Data, Medication data reviewed.  Case discussed with nursing as available.  Plan of Care reviewed with patient and notified of changes.  6/11 patient overall stabilized and improved, has continued facial droop and slight weakness, she is mentating normally, family at the bedside.  Alert and oriented x4, some ataxia, left-sided weakness.  Mild right-sided headache.  Consultants/Specialty  Neurology, Dr Steen  Pulmonary    Code Status  FULL    Disposition  ICU with transition to neurology unit    Review of Systems  Review of Systems   Unable to perform ROS: Language   Constitutional: Negative.  Negative for chills and fever.   HENT: Negative for sore throat.    Eyes: Negative.  Negative for double vision.   Respiratory: Negative.  Negative for cough and shortness of breath.    Cardiovascular: Negative.  Negative for chest pain, palpitations and leg swelling.   Gastrointestinal: Negative.  Negative for heartburn, nausea and vomiting.   Genitourinary: Negative.  Negative for dysuria and frequency.   Musculoskeletal: Negative.  Negative for back pain and neck pain.   Skin: Negative.  Negative for itching and rash.   Neurological: Positive for speech change, focal weakness and headaches. Negative for dizziness, sensory change and weakness.   Endo/Heme/Allergies: Negative.  Negative for polydipsia. Does not bruise/bleed easily.   Psychiatric/Behavioral: Negative for depression. The  patient is nervous/anxious.         Physical Exam  Temp:  [36.5 °C (97.7 °F)-37.8 °C (100.1 °F)] 37.2 °C (98.9 °F)  Pulse:  [61-95] 73  Resp:  [12-56] 15  SpO2:  [90 %-96 %] 95 %    Physical Exam   Constitutional: She is oriented to person, place, and time. She appears well-developed and well-nourished. No distress.   HENT:   Head: Normocephalic and atraumatic.   Nose: Nose normal.   Mouth/Throat: Oropharynx is clear and moist. No oropharyngeal exudate.   Eyes: Pupils are equal, round, and reactive to light. Conjunctivae and EOM are normal. Right eye exhibits no discharge. Left eye exhibits no discharge. No scleral icterus.   Neck: Normal range of motion. Neck supple. No JVD present. No tracheal deviation present. No thyromegaly present.   Cardiovascular: Normal rate, regular rhythm, normal heart sounds and intact distal pulses.  Exam reveals no gallop and no friction rub.    No murmur heard.  Pulses:       Radial pulses are 2+ on the right side, and 2+ on the left side.        Dorsalis pedis pulses are 2+ on the right side, and 2+ on the left side.   Capillary refill <3 secs   Pulmonary/Chest: Effort normal and breath sounds normal. No stridor. No respiratory distress. She has no wheezes. She has no rales.   Abdominal: Soft. Bowel sounds are normal. She exhibits no distension and no mass. There is no tenderness. There is no rebound and no guarding.   Musculoskeletal: Normal range of motion. She exhibits no edema or tenderness.   Lymphadenopathy:     She has no cervical adenopathy.   Neurological: She is alert and oriented to person, place, and time.   Questionable slighter weakness in left hand  compared to right. Close to 5/5 strength  Mild left facial droop   Skin: Skin is warm and dry. She is not diaphoretic. No cyanosis.   Psychiatric: She has a normal mood and affect. Her behavior is normal.   Nursing note and vitals reviewed.      Fluids    Intake/Output Summary (Last 24 hours) at 06/11/19 0807  Last data  filed at 06/11/19 0600   Gross per 24 hour   Intake             1120 ml   Output             1900 ml   Net             -780 ml       Laboratory  Recent Labs      06/09/19   2302  06/10/19   0420  06/11/19   0505   WBC  7.5  7.2  6.5   RBC  4.49  4.20  4.26   HEMOGLOBIN  12.9  12.1  12.2   HEMATOCRIT  40.2  38.1  37.6   MCV  89.5  90.7  88.3   MCH  28.7  28.8  28.6   MCHC  32.1*  31.8*  32.4*   RDW  43.1  44.8  43.8   PLATELETCT  478*  412  412   MPV  10.1  9.9  10.0     Recent Labs      06/09/19   2302  06/10/19   0420  06/11/19   0505   SODIUM  138  137  137   POTASSIUM  4.3  4.1  4.0   CHLORIDE  103  107  105   CO2  25  22  23   GLUCOSE  111*  151*  92   BUN  18  16  14   CREATININE  1.16  1.07  1.11   CALCIUM  9.6  8.3*  9.1     Recent Labs      06/09/19   2302   APTT  24.2*   INR  1.12         Recent Labs      06/09/19   2302   TRIGLYCERIDE  158*   HDL  48   LDL  112*       Imaging  CT-HEAD W/O   Final Result         1.  No acute intracranial abnormality is identified, mild atrophy is noted.   2.  Subtle low-density changes in the right frontal lobe, new since prior study, appearance most compatible with evolving infarct.      DX-CHEST-PORTABLE (1 VIEW)   Final Result         1.  Interstitial bilateral pulmonary opacities suggests interstitial edema and/or infiltrate.   2.  Atherosclerosis      CT-CTA NECK WITH & W/O-POST PROCESSING   Final Result         1.  Hypoplastic left vertebral artery, otherwise CT angiogram of the neck within normal limits.      CT-CTA HEAD WITH & W/O-POST PROCESS   Final Result         1.  Distal right M1 occlusion   2.  Hypoplastic left A1   3.  Persistent fetal morphology of the right posterior cerebral artery.      These findings were discussed with the patient's clinician, MIKEY ONEAL, on 6/9/2019 11:23 PM.      CT-CEREBRAL PERFUSION ANALYSIS   Final Result         1.  Cerebral blood flow less than 30% likely representing completed infarct = 50 mL.      2.  T Max more than 6  seconds likely representing combination of completed infarct and ischemia = 99 mL.      3.  Mismatched volume likely representing ischemic brain/penumbra = 49 mL      4.  Please note that the cerebral perfusion was performed on the limited brain tissue around the basal ganglia region. Infarct/ischemia outside the CT perfusion sections can be missed in this study.      CT-HEAD W/O   Final Result         1.  No acute intracranial abnormality is identified, there are nonspecific white matter changes, commonly associated with small vessel ischemic disease.  Associated mild cerebral atrophy is noted.      IR-THROMBO MECHANICAL ARTERY,INIT    (Results Pending)   MR-BRAIN-WITH & W/O    (Results Pending)        Assessment/Plan  Stroke (HCC)- (present on admission)   Assessment & Plan    Status post TPA at 11:45 PM on 6/9/2019  CTA head with IV contrast reveals distal right M1 occlusion, patient will be taken to IR for mechanical thrombectomy  Patient will be admitted to the ICU with neurochecks per TPA protocol, stabilized  MRI noted, echocardiogram pending  Initiating aspirin after 24 hours from TPA and initiated on high intensity atorvastatin 40 mg  Neurology consulting  PT/OT/ST  Normal TSH  Monitoring on telemetry for any signs of arrhythmia         Dyslipidemia   Assessment & Plan    Elevated triglycerides and LDL  Initiated on high intensity statin  6/10 glycohemoglobin: 6.3     Hypertension- (present on admission)   Assessment & Plan    S/P Mechanical thrombectomy will have blood pressure not to exceed a systolic blood pressure greater than 160  We will adjust PRN medications as such  Monitoring vitals       Plan  Maximize medical therapy  Await additional imaging  PT OT eval  Transfer to neurology unit  See orders  Medically complex and high risk    VTE prophylaxis: SCD's    I have performed a physical exam and reviewed and updated ROS and Plan today . In review of yesterday's note , there are no changes except as  documented above.

## 2019-06-11 NOTE — PROGRESS NOTES
Lab called with critical result of sodium 117 at 0021. Critical lab result read back to lab.   This critical lab result is within parameters established by Dr. Pompa for this patient.

## 2019-06-11 NOTE — THERAPY
"Occupational Therapy Evaluation completed.   Functional Status: Pt is a 72 y/o female admitted with L sided weakness and aphasia, found to have a R M1 occlusion now s/p thrombectomy and alteplase infusion. She was pleasant and cooperative, Lummi. Utilized  for evaluation. At times, family would interrupt  or misinterpret for the patient which led to difficult assessment of UE testing, as family would often answer for patient. Supv bed mobility. Hossein sit<>stand and stand pivot txf to BSC. Hossein toileting. Supv to don socks. LUE with impaired sensation and proprioception during LB dressing. Vision WDL. With formal testing, BUE AROM WDL. LUE grossly weak and appears to have numbness on dorsal/volar aspect of L hand. Again difficult to assess due to family interference with translating. Impaired fine and gross motor coordination LUE. Hossein functional mobility with HHA. She is limited by weakness, fatigue, impaired balance, and impaired cognition which impacts independence in self care and functional mobility.  Plan of Care: Will benefit from Occupational Therapy 4 times per week  Discharge Recommendations:  Equipment: Will Continue to Assess for Equipment Needs. Recommend inpatient transitional care services for continued occupational therapy services.       See \"Rehab Therapy-Acute\" Patient Summary Report for complete documentation.    "

## 2019-06-11 NOTE — PROGRESS NOTES
MRI check list completed with patient and son using video . Son refused to sign consent at this time, as he is unsure of his answers. Would like to wait for patient's daughter to confirm.

## 2019-06-11 NOTE — PROGRESS NOTES
CC:Stroke    HPI:   Ms. Jordan remains in the [facility] ICU. No acute events overnight. Patient's son declined MRI yesterday because he was not sure whether she has metal in her left arm from prior orthopedic surgery 25 years ago.      Review of Systems   Constitutional: Negative for chills and fever.   Eyes: Negative for blurred vision and double vision.   Cardiovascular: Positive for chest pain. Negative for palpitations.   Gastrointestinal: Negative for nausea and vomiting.   Genitourinary: Negative for frequency.   Skin: Negative for rash.   Neurological: Positive for focal weakness.   All other systems were reviewed and were negative.     Current Facility-Administered Medications:   •  Respiratory Care per Protocol, , Nebulization, Continuous RT, Emmanuel Newberry M.D.  •  aspirin (ASA) tablet 325 mg, 325 mg, Oral, DAILY, 325 mg at 06/11/19 0117 **OR** aspirin (ASA) chewable tab 324 mg, 324 mg, Oral, DAILY **OR** aspirin (ASA) suppository 300 mg, 300 mg, Rectal, DAILY, Emmanuel Newberry M.D.  •  atorvastatin (LIPITOR) tablet 40 mg, 40 mg, Oral, Q EVENING, Emmanuel Newberry M.D., 40 mg at 06/10/19 1841  •  MD Alert...ICU Electrolyte Replacement per Pharmacy, , Other, PHARMACY TO DOSE, Daniel Morin D.O.  •  acetaminophen (TYLENOL) tablet 650 mg, 650 mg, Oral, Q4HRS PRN, JIM FalkOCecilia, 650 mg at 06/11/19 0804  •  prochlorperazine (COMPAZINE) injection 10 mg, 10 mg, Intravenous, Q6HRS PRN, JIM FalkO.  •  labetalol (NORMODYNE,TRANDATE) injection 10 mg, 10 mg, Intravenous, Q4HRS PRN **OR** hydrALAZINE (APRESOLINE) injection 10 mg, 10 mg, Intravenous, Q2HRS PRN, JIM FalkO., 10 mg at 06/10/19 2106    Physical Examination:  Ambulatory Vitals  Encounter Vitals  Temperature: 37.6 °C (99.7 °F)  Temp src: Temporal  Blood Pressure : 135/58  BP Location: Right, Upper Arm  Patient BP Position: Supine  Pulse: 85  Respiration: (!) 24  Pulse Oximetry: 97 %  O2 (LPM): 0  O2 Delivery: None (Room Air)  Weight: 57.6 kg  "(126 lb 15.8 oz)  Weight Source: Bed Scale  Reason weight was either estimated or stated: Non-weight bearing  Height: 157.5 cm (5' 2\")  BMI (Calculated): 23.23  Location: Head  Description: Pressure  Breastfeeding Status: No    Constitutional: Well-developed, well-nourished, good hygiene. Appears stated age.  Cardiovascular: RRR, with no murmurs, rubs or gallops. No carotid bruits.   Respiratory: Lungs CTA B/L, no W/R/R.   Abdomen: Soft Non-tender to Palpation. Non-distended.  Extremities: No peripheral edema, pedal pulses intact.   Skin: Warm, dry, intact. No rashes observed.  Eyes: Sclera anicteric. No nystagmus observed.   Neurologic:   Mental Status: Awake and alert.    Speech: Mildly dysarthric.  .           Fund of Knowledge: Appropriate   Cranial Nerves:    CN II: Pupils are equal and react appropriately. No APD noted.    CN III, IV, VI: Good eye closure. Extraocular movements are intact.     CN V: Facial sensation is intact and symmetric.     CN VII: Mild NLF on left.     CN VIII: Hearing is grossly intact.    CN IX and X: Palate elevates symmetrically.    CN XI: Shoulder shrug is symmetric.     CN XII: Tongue is midline.   Sensory: Sensation is intact to vibration and temperature.    Coordination: There is no discoordination detected with finger tapping or finger to nose testing.        DTR's: Reflexes are 2+ and symmetrical.   Babinski: Toes are downgoing bilaterally.    Romberg: Deferred.   Movements: No tremors noted.  Musculoskeletal:    Strength:   Deltoids      R 5/5 L 5/5  Biceps        R 5/5 L 5/5  Triceps       R 5/5 L 5/5  Wrist Ext    R 5/5 L 5-/5  Finger Flex R 5/5 L 5-/5  Finger Ext   R 5/5 L 5-/5  Hip Flex      R 5/5 L 5/5  Knee Flex   R 5/5 L 5/5  Knee Ext    R 5/5 L 5/5  Ankle DF    R 5/5 L 5/5  Ankle PF    R 5/5 L 5/5   Gait: Deferred.   Tone: Normal bulk and tone.    Joints: No joint swelling.   Psychiatric: Mood and affect are appropriate.     NIHSS: 2.    MRI Imaging Reviewed:    Head CT " w/o contrast from 6/10/19.  1.  No acute intracranial abnormality is identified, mild atrophy is noted.  2.  Subtle low-density changes in the right frontal lobe, new since prior study, appearance most compatible with evolving infarct.    Assessment and Plan:     Ms. Alvarez is a 72 yo F with pmhx of prior stroke 3 years ago likely right cerebellar (seen on head CT), hld, who developed acute onset R MCA occlusion with symptoms of left face/arm weakness now s/p iv TPA and thrombectomy. Suspect cardioembolic source. She is now 24 hours s/p tpa. Repeat head CT was reviewed and was negative for hemorrhagic conversion.   Plan:  1. Okay to transfer to the general Neurology floor with telemetry.   2. Continue with ASA 325mg and statin  3. Needs cardiology consultation -  Etiology is most likely cardioembolic and therefore should be started on anticoagulation.  4. Patient's family has consented for MRI. We will x-ray her left arm first. This would be helpful in confirming stroke etiology and for true baseline.   5. PT/OT.   6. Passed swallow evaluation. On nectar thick liquids.  7. Will need follow up for continuation of anticoagulation in [facility].         Marlyn Steen D.O., M.P.H  MS specialist.   Board Certified Neurologist.  Neurology Clerkship Director, [facility].    Neurology,  [facility].   Tel: [phone]  Fax: [phone]

## 2019-06-11 NOTE — CARE PLAN
Problem: Acute Care of the Stroke Patient  Goal: Optimal Outcome for the Stroke patient  Outcome: PROGRESSING AS EXPECTED    Intervention: Vital Signs and Neuro Checks per order  On going per order  Intervention: NIHSS completed and documented  completed  Intervention: Telemetry monitoring (discontinue after 24 hours unless contraindicated. If monitoring required beyond 24 hours obtain order for monitored bed)  On going  Intervention: CT Scan results (document hemorrhage or no)  Completed. No hemorrhage    Intervention: Consider MRI/Consider MRA  Ordered    Intervention: Consider Echocardiogram  ordered  Intervention: Lipid panel if not completed in ED (if drawn previously, must be within 30 days to be current)  done  Intervention: PT, PTT, INR per anticoagulation orders if applicable  done  Intervention: Antithrombotic meds ordered. Must be given by end of hospital day two.  Yes, ASA given early morning 6/11      Intervention: Dysphagia screen within 24 hours, patient remains NPO until screen complete  Done.  Intervention: Neuro Consult  Completed.  Intervention: PT/OT/SLP needs: LIP must assess the need for Rehab Services. If none needed, LIP must document reason.  PT/OT ordered. SLP following patient

## 2019-06-12 ENCOUNTER — APPOINTMENT (OUTPATIENT)
Dept: CARDIOLOGY | Facility: MEDICAL CENTER | Age: 74
DRG: 023 | End: 2019-06-12
Attending: PSYCHIATRY & NEUROLOGY

## 2019-06-12 LAB
ANION GAP SERPL CALC-SCNC: 10 MMOL/L (ref 0–11.9)
BASOPHILS # BLD AUTO: 0.5 % (ref 0–1.8)
BASOPHILS # BLD: 0.03 K/UL (ref 0–0.12)
BNP SERPL-MCNC: 19 PG/ML (ref 0–100)
BUN SERPL-MCNC: 19 MG/DL (ref 8–22)
CALCIUM SERPL-MCNC: 9.7 MG/DL (ref 8.5–10.5)
CHLORIDE SERPL-SCNC: 103 MMOL/L (ref 96–112)
CO2 SERPL-SCNC: 22 MMOL/L (ref 20–33)
CREAT SERPL-MCNC: 1.18 MG/DL (ref 0.5–1.4)
EOSINOPHIL # BLD AUTO: 0.07 K/UL (ref 0–0.51)
EOSINOPHIL NFR BLD: 1.1 % (ref 0–6.9)
ERYTHROCYTE [DISTWIDTH] IN BLOOD BY AUTOMATED COUNT: 42.7 FL (ref 35.9–50)
GLUCOSE SERPL-MCNC: 99 MG/DL (ref 65–99)
HCT VFR BLD AUTO: 37.1 % (ref 37–47)
HGB BLD-MCNC: 12.4 G/DL (ref 12–16)
IMM GRANULOCYTES # BLD AUTO: 0.01 K/UL (ref 0–0.11)
IMM GRANULOCYTES NFR BLD AUTO: 0.2 % (ref 0–0.9)
LV EJECT FRACT  99904: 65
LV EJECT FRACT  99904: 65
LV EJECT FRACT MOD 2C 99903: 34.13
LV EJECT FRACT MOD 2C 99903: 34.13
LV EJECT FRACT MOD 4C 99902: 67.97
LV EJECT FRACT MOD 4C 99902: 67.97
LV EJECT FRACT MOD BP 99901: 55.1
LV EJECT FRACT MOD BP 99901: 55.1
LYMPHOCYTES # BLD AUTO: 2.37 K/UL (ref 1–4.8)
LYMPHOCYTES NFR BLD: 38.8 % (ref 22–41)
MAGNESIUM SERPL-MCNC: 2.1 MG/DL (ref 1.5–2.5)
MCH RBC QN AUTO: 29.3 PG (ref 27–33)
MCHC RBC AUTO-ENTMCNC: 33.4 G/DL (ref 33.6–35)
MCV RBC AUTO: 87.7 FL (ref 81.4–97.8)
MONOCYTES # BLD AUTO: 0.5 K/UL (ref 0–0.85)
MONOCYTES NFR BLD AUTO: 8.2 % (ref 0–13.4)
NEUTROPHILS # BLD AUTO: 3.13 K/UL (ref 2–7.15)
NEUTROPHILS NFR BLD: 51.2 % (ref 44–72)
NRBC # BLD AUTO: 0 K/UL
NRBC BLD-RTO: 0 /100 WBC
PHOSPHATE SERPL-MCNC: 4.5 MG/DL (ref 2.5–4.5)
PLATELET # BLD AUTO: 399 K/UL (ref 164–446)
PMV BLD AUTO: 10.2 FL (ref 9–12.9)
POTASSIUM SERPL-SCNC: 4 MMOL/L (ref 3.6–5.5)
RBC # BLD AUTO: 4.23 M/UL (ref 4.2–5.4)
SODIUM SERPL-SCNC: 135 MMOL/L (ref 135–145)
WBC # BLD AUTO: 6.1 K/UL (ref 4.8–10.8)

## 2019-06-12 PROCEDURE — 700111 HCHG RX REV CODE 636 W/ 250 OVERRIDE (IP): Performed by: HOSPITALIST

## 2019-06-12 PROCEDURE — 93306 TTE W/DOPPLER COMPLETE: CPT | Mod: 26 | Performed by: INTERNAL MEDICINE

## 2019-06-12 PROCEDURE — A9270 NON-COVERED ITEM OR SERVICE: HCPCS | Performed by: HOSPITALIST

## 2019-06-12 PROCEDURE — 700102 HCHG RX REV CODE 250 W/ 637 OVERRIDE(OP): Performed by: INTERNAL MEDICINE

## 2019-06-12 PROCEDURE — 80048 BASIC METABOLIC PNL TOTAL CA: CPT

## 2019-06-12 PROCEDURE — 83735 ASSAY OF MAGNESIUM: CPT

## 2019-06-12 PROCEDURE — 85025 COMPLETE CBC W/AUTO DIFF WBC: CPT

## 2019-06-12 PROCEDURE — 770020 HCHG ROOM/CARE - TELE (206)

## 2019-06-12 PROCEDURE — 700102 HCHG RX REV CODE 250 W/ 637 OVERRIDE(OP): Performed by: HOSPITALIST

## 2019-06-12 PROCEDURE — 93306 TTE W/DOPPLER COMPLETE: CPT

## 2019-06-12 PROCEDURE — 99233 SBSQ HOSP IP/OBS HIGH 50: CPT | Performed by: PSYCHIATRY & NEUROLOGY

## 2019-06-12 PROCEDURE — 99233 SBSQ HOSP IP/OBS HIGH 50: CPT | Performed by: HOSPITALIST

## 2019-06-12 PROCEDURE — 84100 ASSAY OF PHOSPHORUS: CPT

## 2019-06-12 PROCEDURE — 83880 ASSAY OF NATRIURETIC PEPTIDE: CPT

## 2019-06-12 PROCEDURE — A9270 NON-COVERED ITEM OR SERVICE: HCPCS | Performed by: INTERNAL MEDICINE

## 2019-06-12 RX ADMIN — ACETAMINOPHEN 650 MG: 325 TABLET, FILM COATED ORAL at 00:13

## 2019-06-12 RX ADMIN — ACETAMINOPHEN 650 MG: 325 TABLET, FILM COATED ORAL at 20:46

## 2019-06-12 RX ADMIN — ENOXAPARIN SODIUM 40 MG: 100 INJECTION SUBCUTANEOUS at 11:52

## 2019-06-12 RX ADMIN — ASPIRIN 325 MG: 325 TABLET, FILM COATED ORAL at 00:13

## 2019-06-12 RX ADMIN — ATORVASTATIN CALCIUM 40 MG: 40 TABLET, FILM COATED ORAL at 17:56

## 2019-06-12 RX ADMIN — PROCHLORPERAZINE EDISYLATE 10 MG: 5 INJECTION INTRAMUSCULAR; INTRAVENOUS at 21:02

## 2019-06-12 ASSESSMENT — ENCOUNTER SYMPTOMS
SORE THROAT: 0
DIZZINESS: 0
DOUBLE VISION: 0
SHORTNESS OF BREATH: 0
PALPITATIONS: 0
CONSTITUTIONAL NEGATIVE: 1
FOCAL WEAKNESS: 1
BACK PAIN: 0
CHILLS: 0
FEVER: 0
CARDIOVASCULAR NEGATIVE: 1
SPEECH CHANGE: 1
SENSORY CHANGE: 0
RESPIRATORY NEGATIVE: 1
NAUSEA: 0
SENSORY CHANGE: 1
HEARTBURN: 0
BLURRED VISION: 0
MUSCULOSKELETAL NEGATIVE: 1
BRUISES/BLEEDS EASILY: 0
POLYDIPSIA: 0
GASTROINTESTINAL NEGATIVE: 1
EYES NEGATIVE: 1
HEADACHES: 1
NECK PAIN: 0
WEAKNESS: 0
VOMITING: 0
COUGH: 0
DEPRESSION: 0
NERVOUS/ANXIOUS: 1

## 2019-06-12 NOTE — CONSULTS
Medical chart review completed.     Patient is a 73 y.o. female with unknown past medical history, admitted to [facility] on 6/9/2019, with left sided weakness and difficulty speaking. Head CT negative, CTA with distal right M1 occlusion, perfusion scan with mismatch. NIHSS 26. Patient received tPA when went for thrombectomy with Dr. Armstrong. NIHSS post intervention 2. HgbA1c 6.3, , ECHO EF 65 %    Patient with few co-morbidities (including but not limited to renal insufficiency); with cognitive deficits and functional deficits in mobility/self-cares/swallowing/speech, and Moderate de-conditioning.     Pre-morbidly, this patient lived in a unknown level home with unknown steps to enter, . Patient recently moved here from [facility] to live with her daughter.     The patient was evaluated by acute care Physical Therapy, Occupational Therapy and Speech Language Pathology; currently requiring supervision to min assistance for mobility and supervision to min assistance for ADLs, also with ongoing speech and swallowing deficits.     PT reports patient may be at baseline and anticipate patient will be able to discharge home with family support.     6 clicks score 18 mobility, 18 ADLs    The patient is not a good candidate for an acute inpatient rehabilitation program as she is pretty high level (supervision to min assist, and per PT may be close to baseline), and so does not require intensive 3 hours of daily therapy.    Recommend discharge home if she has good family support, with home health PT/OT/SLP for 4 weeks.    Thank you for allowing us to participate in her care.    Radha Zhang M.D.  Physical Medicine and Rehabilitation

## 2019-06-12 NOTE — CARE PLAN
Problem: Venous Thromboembolism (VTW)/Deep Vein Thrombosis (DVT) Prevention:  Goal: Patient will participate in Venous Thrombosis (VTE)/Deep Vein Thrombosis (DVT)Prevention Measures  Outcome: PROGRESSING AS EXPECTED  Pt wearing SCD's. Will address medication prophylaxis in IDR    Problem: Skin Integrity  Goal: Risk for impaired skin integrity will decrease  Outcome: PROGRESSING AS EXPECTED  Pt demonstrates understanding of turning self in bed. Skin assessment completed. Up in chair currently. Mobilizes frequent from chair/bed/commode. No areas of pressure injury noted.

## 2019-06-12 NOTE — CARE PLAN
Problem: Acute Care of the Stroke Patient  Goal: Optimal Outcome for the Stroke patient  Outcome: PROGRESSING AS EXPECTED

## 2019-06-12 NOTE — PROGRESS NOTES
CC: Stroke    HPI:   No acute events overnight.  Ms. Jordan remains in the [facility] ICU.  She underwent MRI of the brain yesterday which confirmed the presence of a fairly sizable right MCA stroke.  Overall her symptoms have been improving.  She only remains in the ICU because there were no beds on the telemetry floor.  There have been no cardiac events observed on her telemetry thus far.  She denies any chest pain or shortness of breath.  She had a mild headache last night but it has resolved.  She has been able to ambulate to the commode.      Review of Systems   Constitutional: Negative for chills and fever.   HENT: Negative for hearing loss.    Eyes: Negative for blurred vision.   Respiratory: Negative for shortness of breath.    Cardiovascular: Negative for chest pain and palpitations.   Gastrointestinal: Negative for nausea and vomiting.   Genitourinary: Negative for frequency.   Skin: Negative for rash.   Neurological: Positive for sensory change, focal weakness and headaches. Negative for dizziness.   All other systems were reviewed and were negative.      Current Facility-Administered Medications:   •  enoxaparin (LOVENOX) inj 40 mg, 40 mg, Subcutaneous, DAILY, Mike Parks M.D., 40 mg at 06/12/19 1152  •  Respiratory Care per Protocol, , Nebulization, Continuous RT, Emmanuel Newberry M.D.  •  aspirin (ASA) tablet 325 mg, 325 mg, Oral, DAILY, 325 mg at 06/12/19 0013 **OR** aspirin (ASA) chewable tab 324 mg, 324 mg, Oral, DAILY **OR** aspirin (ASA) suppository 300 mg, 300 mg, Rectal, DAILY, Emmanuel Newberry M.D.  •  atorvastatin (LIPITOR) tablet 40 mg, 40 mg, Oral, Q EVENING, Emmanuel Newberry M.D., 40 mg at 06/11/19 1719  •  acetaminophen (TYLENOL) tablet 650 mg, 650 mg, Oral, Q4HRS PRN, Daniel Morin D.O., 650 mg at 06/12/19 0013  •  prochlorperazine (COMPAZINE) injection 10 mg, 10 mg, Intravenous, Q6HRS PRN, JIM FalkO.  •  labetalol (NORMODYNE,TRANDATE) injection 10 mg, 10 mg, Intravenous, Q4HRS PRN  **OR** hydrALAZINE (APRESOLINE) injection 10 mg, 10 mg, Intravenous, Q2HRS PRN, Daniel Morin D.O., 10 mg at 06/10/19 2106    Physical examination:  Vitals:    06/12/19 1100   BP:    Pulse:    Resp: 18   Temp:    SpO2:      Constitutional: Well-developed, well-nourished, good hygiene. Appears stated age.  Cardiovascular: RRR, with no murmurs, rubs or gallops. No carotid bruits.   Respiratory: Lungs CTA B/L, no W/R/R.   Abdomen: Soft Non-tender to Palpation. Non-distended.  Extremities: No peripheral edema, pedal pulses intact.   Skin: Warm, dry, intact. No rashes observed.  Eyes: Sclera anicteric. No nystagmus observed.   Neurologic:   Mental Status: Awake and alert.    Speech: Speech is fluent with normal prosody.   .           Fund of Knowledge: Appropriate   Cranial Nerves:    CN II: Pupils are equal and react appropriately. No APD noted.    CN III, IV, VI: Good eye closure. Extraocular movements are intact.     CN V: Facial sensation is intact and symmetric.     CN VII: Slight left facial droop.    CN VIII: Hearing is grossly intact.    CN IX and X: Palate elevates symmetrically.    CN XI: Shoulder shrug is symmetric.     CN XII: Tongue is midline.   Sensory: Slight decrease in light touch in the left arm.  Vibratory sensation intact.  Temperature sensation intact.   Coordination: Decreased coordination in the left hand with finger tapping.   DTR's: Reflexes are 2+ and symmetrical.   Babinski: Left toes upgoing.    Romberg: Deferred.   Movements: No tremors noted.  Musculoskeletal:    Strength: Decreased handgrip on the left.   Gait: Deferred.   Tone: Normal bulk and tone.    Joints: No joint swelling.   Psychiatric: Mood and affect are appropriate.       1a. LOC: 0  1b. LOC Questions: 0  1c. LOC Commands: 0  2. Best Gaze:0  3. Visual Fields: 0  4. Facial Paresis: 1  5a. Motor arm left: 1  5b. Motor arm right: 0  6a. Motor leg left: 0  6b. Motor leg right: 0  7. Sensory: 1  8. Best Language: 0  9. Limb Ataxia:  0  10. Dysarthria: 0  11. Extinction/Inattention: 1    NIHSS 4        Imaging reviewed:  MRI of the brain without contrast from June 11, 2019  1.  Moderately extensive acute cerebral infarction in the right MCA territory primarily perisylvian/right frontal operculum. Additional small area of infarction involving the right posterior putamen extending up into the right posterior frontal corona   radiata.  2.  No hemorrhagic transformation.  3.  Moderate area of chronic encephalomalacia with hemosiderin deposition in the right cerebellar hemisphere superiorly consistent with old hemorrhage or old hemorrhagic infarct.             Assessment and Plan:     Ms. Jordan is a 73-year-old female with a past medical history of hypertension, hyperlipidemia, likely right cerebellar ischemic stroke 3 years ago, who sustained a right MCA cardioembolic occlusion with symptoms of left face and arm weakness who is status post IV TPA and thrombectomy.  Her MRI imaging confirms right MCA stroke.  I have personally reviewed her MRI images.  There is no evidence of hemorrhagic transformation.    Plan:  1.  Clear to transfer to the general neurology floor with telemetry.   2.  Continue aspirin 325 mg and statin.  3.  Cardiology consult for anticoagulation.  Recommend starting 10 days post stroke.  Should also have cardiac monitoring given high likelihood of paroxysmal atrial fibrillation.  4.  Continue with PT, OT, and speech.  5.  At this point in time, neurology will be signing off the patient's case.  She will need establishment of care in [facility] if she plans to return there.  If she plans to remain in the [place], cardiology and neurology follow-up should be arranged.         Marlyn Steen D.O., M.P.H  MS specialist.   Board Certified Neurologist.  Neurology Clerkship Director, [facility].    Neurology,  [facility].   Tel:  [phone]  Fax: [phone]

## 2019-06-12 NOTE — CARE PLAN
Problem: Safety  Goal: Will remain free from injury  Outcome: PROGRESSING AS EXPECTED    Goal: Will remain free from falls  Outcome: PROGRESSING AS EXPECTED      Problem: Infection  Goal: Will remain free from infection  Outcome: PROGRESSING AS EXPECTED      Problem: Bowel/Gastric:  Goal: Normal bowel function is maintained or improved  Outcome: PROGRESSING AS EXPECTED

## 2019-06-12 NOTE — PROGRESS NOTES
Hospital Medicine Daily Progress Note    Date of Service  6/12/2019    Chief Complaint  Left side weakness and aphasia    Hospital Course    74yo Nepali-speaking F (Adrienne Jordan from [facility]) admitted 6/9/2019 with left side weakness and aphasia at 2200 6/9.  TPA was given at 23:45 and patient was taken for mechanical thrombectomy.        Interval Problem Update  Patient seen and examined today. ICU Care  Care and plan discussed in IDT/Hot rounds.  Lines and assistive devices reviewed.    Patient tolerating treatment and therapies.  All Data, Medication data reviewed.  Case discussed with nursing as available.  Plan of Care reviewed with patient and notified of changes.  6/11 patient overall stabilized and improved, has continued facial droop and slight weakness, she is mentating normally, family at the bedside.  Alert and oriented x4, some ataxia, left-sided weakness.  Mild right-sided headache.  6/12 patient overall slowly improving, MRI with significant right MCA CVA on MRI, echocardiogram is overall benign, favorable, mild right-sided headache persists and left-sided weakness.  Patient able to ambulate some to the commode with assistance, telemetry without evidence of current arrhythmia.  Thought likely to have occult arrhythmia/cryptic stroke scenario at this time  Consultants/Specialty  Neurology, Dr Steen  Pulmonary    Code Status  FULL    Disposition  ICU with transition to neurology unit    Review of Systems  Review of Systems   Unable to perform ROS: Language   Constitutional: Negative.  Negative for chills and fever.   HENT: Negative for sore throat.    Eyes: Negative.  Negative for double vision.   Respiratory: Negative.  Negative for cough and shortness of breath.    Cardiovascular: Negative.  Negative for chest pain, palpitations and leg swelling.   Gastrointestinal: Negative.  Negative for heartburn, nausea and vomiting.   Genitourinary: Negative.  Negative for dysuria and frequency.    Musculoskeletal: Negative.  Negative for back pain and neck pain.   Skin: Negative.  Negative for itching and rash.   Neurological: Positive for speech change, focal weakness and headaches. Negative for dizziness, sensory change and weakness.   Endo/Heme/Allergies: Negative.  Negative for polydipsia. Does not bruise/bleed easily.   Psychiatric/Behavioral: Negative for depression. The patient is nervous/anxious.         Physical Exam  Temp:  [36.9 °C (98.4 °F)-38.2 °C (100.8 °F)] 36.9 °C (98.4 °F)  Pulse:  [67-89] (P) 67  Resp:  [15-28] (P) 18  SpO2:  [92 %-97 %] 94 %    Physical Exam   Constitutional: She is oriented to person, place, and time. She appears well-developed and well-nourished. No distress.   HENT:   Head: Normocephalic and atraumatic.   Nose: Nose normal.   Mouth/Throat: Oropharynx is clear and moist. No oropharyngeal exudate.   Eyes: Pupils are equal, round, and reactive to light. Conjunctivae and EOM are normal. Right eye exhibits no discharge. Left eye exhibits no discharge. No scleral icterus.   Neck: Normal range of motion. Neck supple. No JVD present. No tracheal deviation present. No thyromegaly present.   Cardiovascular: Normal rate, regular rhythm, normal heart sounds and intact distal pulses.  Exam reveals no gallop and no friction rub.    No murmur heard.  Pulses:       Radial pulses are 2+ on the right side, and 2+ on the left side.        Dorsalis pedis pulses are 2+ on the right side, and 2+ on the left side.   Capillary refill <3 secs   Pulmonary/Chest: Effort normal and breath sounds normal. No stridor. No respiratory distress. She has no wheezes. She has no rales.   Abdominal: Soft. Bowel sounds are normal. She exhibits no distension and no mass. There is no tenderness. There is no rebound and no guarding.   Musculoskeletal: Normal range of motion. She exhibits no edema or tenderness.   Lymphadenopathy:     She has no cervical adenopathy.   Neurological: She is alert and oriented to  person, place, and time.   Questionable slighter weakness in left hand  compared to right. Close to 5/5 strength  Mild left facial droop   Skin: Skin is warm and dry. She is not diaphoretic. No cyanosis.   Psychiatric: She has a normal mood and affect. Her behavior is normal.   Nursing note and vitals reviewed.      Fluids    Intake/Output Summary (Last 24 hours) at 06/12/19 0746  Last data filed at 06/11/19 2200   Gross per 24 hour   Intake              300 ml   Output             2000 ml   Net            -1700 ml       Laboratory  Recent Labs      06/10/19   0420  06/11/19   0505  06/12/19   0414   WBC  7.2  6.5  6.1   RBC  4.20  4.26  4.23   HEMOGLOBIN  12.1  12.2  12.4   HEMATOCRIT  38.1  37.6  37.1   MCV  90.7  88.3  87.7   MCH  28.8  28.6  29.3   MCHC  31.8*  32.4*  33.4*   RDW  44.8  43.8  42.7   PLATELETCT  412  412  399   MPV  9.9  10.0  10.2     Recent Labs      06/10/19   0420  06/11/19   0505  06/12/19   0414   SODIUM  137  137  135   POTASSIUM  4.1  4.0  4.0   CHLORIDE  107  105  103   CO2  22  23  22   GLUCOSE  151*  92  99   BUN  16  14  19   CREATININE  1.07  1.11  1.18   CALCIUM  8.3*  9.1  9.7     Recent Labs      06/09/19   2302   APTT  24.2*   INR  1.12     Recent Labs      06/12/19   0414   BNPBTYPENAT  19     Recent Labs      06/09/19   2302   TRIGLYCERIDE  158*   HDL  48   LDL  112*       Imaging  MR-BRAIN-W/O   Final Result      1.  Moderately extensive acute cerebral infarction in the right MCA territory primarily perisylvian/right frontal operculum. Additional small area of infarction involving the right posterior putamen extending up into the right posterior frontal corona    radiata.   2.  No hemorrhagic transformation.   3.  Moderate area of chronic encephalomalacia with hemosiderin deposition in the right cerebellar hemisphere superiorly consistent with old hemorrhage or old hemorrhagic infarct.      IR-THROMBO MECHANICAL ARTERY,INIT   Final Result      1. Occlusion of a RIGHT M2  segment.   2. Successful RIGHT middle cerebral artery mechanical thrombectomy using aspiration thrombectomy.   3. Post thrombectomy arteriogram demonstrates patent lenticulostriate, RIGHT M1 and improved flow in the previously occluded RIGHT M2 segment.         CT-HEAD W/O   Final Result         1.  No acute intracranial abnormality is identified, mild atrophy is noted.   2.  Subtle low-density changes in the right frontal lobe, new since prior study, appearance most compatible with evolving infarct.      DX-CHEST-PORTABLE (1 VIEW)   Final Result         1.  Interstitial bilateral pulmonary opacities suggests interstitial edema and/or infiltrate.   2.  Atherosclerosis      CT-CTA NECK WITH & W/O-POST PROCESSING   Final Result         1.  Hypoplastic left vertebral artery, otherwise CT angiogram of the neck within normal limits.      CT-CTA HEAD WITH & W/O-POST PROCESS   Final Result         1.  Distal right M1 occlusion   2.  Hypoplastic left A1   3.  Persistent fetal morphology of the right posterior cerebral artery.      These findings were discussed with the patient's clinician, MIKEY ONEAL, on 6/9/2019 11:23 PM.      CT-CEREBRAL PERFUSION ANALYSIS   Final Result         1.  Cerebral blood flow less than 30% likely representing completed infarct = 50 mL.      2.  T Max more than 6 seconds likely representing combination of completed infarct and ischemia = 99 mL.      3.  Mismatched volume likely representing ischemic brain/penumbra = 49 mL      4.  Please note that the cerebral perfusion was performed on the limited brain tissue around the basal ganglia region. Infarct/ischemia outside the CT perfusion sections can be missed in this study.      CT-HEAD W/O   Final Result         1.  No acute intracranial abnormality is identified, there are nonspecific white matter changes, commonly associated with small vessel ischemic disease.  Associated mild cerebral atrophy is noted.      EC-ECHOCARDIOGRAM COMPLETE W/  CONT    (Results Pending)        Assessment/Plan  Stroke (HCC)- (present on admission)   Assessment & Plan    Status post TPA at 11:45 PM on 6/9/2019  CTA head with IV contrast reveals distal right M1 occlusion, patient will be taken to IR for mechanical thrombectomy  Patient will be admitted to the ICU with neurochecks per TPA protocol, stabilized  MRI noted, echocardiogram noted, benign  Continue with high intensity statin and full dose aspirin  Neurology consulting  PT/OT/ST  Normal TSH  Monitoring on telemetry for any signs of arrhythmia  Will likely need a loop recorder to evaluate for cardiac dysrhythmia and full anticoagulation if indicated       Dyslipidemia   Assessment & Plan    Elevated triglycerides and LDL  Initiated on high intensity statin  6/10 glycohemoglobin: 6.3     Hypertension- (present on admission)   Assessment & Plan    S/P Mechanical thrombectomy will have blood pressure not to exceed a systolic blood pressure greater than 160  We will adjust PRN medications as such  Monitoring vitals       Plan  Maximized medical therapy  No current arrhythmia documented, continue telemetry monitoring for another 24 hours  Likely need to cardiac loop recorder  Unclear if the patient stays in [place] for follow-up and further management  As per neurology patient would be cleared for full anticoagulation 10 days after acute CVA  PT OT eval to continue   transfer to neurology unit with ongoing telemetry  See orders  Medically complex and high risk  Possible DC home in the next day or 2  VTE prophylaxis: SCD's    I have performed a physical exam and reviewed and updated ROS and Plan today . In review of yesterday's note , there are no changes except as documented above.

## 2019-06-13 LAB
ANION GAP SERPL CALC-SCNC: 10 MMOL/L (ref 0–11.9)
BASOPHILS # BLD AUTO: 0.4 % (ref 0–1.8)
BASOPHILS # BLD: 0.02 K/UL (ref 0–0.12)
BUN SERPL-MCNC: 25 MG/DL (ref 8–22)
CALCIUM SERPL-MCNC: 9.8 MG/DL (ref 8.5–10.5)
CHLORIDE SERPL-SCNC: 100 MMOL/L (ref 96–112)
CO2 SERPL-SCNC: 23 MMOL/L (ref 20–33)
CREAT SERPL-MCNC: 1.3 MG/DL (ref 0.5–1.4)
EOSINOPHIL # BLD AUTO: 0.07 K/UL (ref 0–0.51)
EOSINOPHIL NFR BLD: 1.2 % (ref 0–6.9)
ERYTHROCYTE [DISTWIDTH] IN BLOOD BY AUTOMATED COUNT: 42.5 FL (ref 35.9–50)
GLUCOSE SERPL-MCNC: 94 MG/DL (ref 65–99)
HCT VFR BLD AUTO: 38.3 % (ref 37–47)
HGB BLD-MCNC: 12.5 G/DL (ref 12–16)
IMM GRANULOCYTES # BLD AUTO: 0.01 K/UL (ref 0–0.11)
IMM GRANULOCYTES NFR BLD AUTO: 0.2 % (ref 0–0.9)
LYMPHOCYTES # BLD AUTO: 2.58 K/UL (ref 1–4.8)
LYMPHOCYTES NFR BLD: 45.5 % (ref 22–41)
MCH RBC QN AUTO: 28.9 PG (ref 27–33)
MCHC RBC AUTO-ENTMCNC: 32.6 G/DL (ref 33.6–35)
MCV RBC AUTO: 88.5 FL (ref 81.4–97.8)
MONOCYTES # BLD AUTO: 0.57 K/UL (ref 0–0.85)
MONOCYTES NFR BLD AUTO: 10.1 % (ref 0–13.4)
NEUTROPHILS # BLD AUTO: 2.42 K/UL (ref 2–7.15)
NEUTROPHILS NFR BLD: 42.6 % (ref 44–72)
NRBC # BLD AUTO: 0 K/UL
NRBC BLD-RTO: 0 /100 WBC
PLATELET # BLD AUTO: 446 K/UL (ref 164–446)
PMV BLD AUTO: 10.3 FL (ref 9–12.9)
POTASSIUM SERPL-SCNC: 3.9 MMOL/L (ref 3.6–5.5)
RBC # BLD AUTO: 4.33 M/UL (ref 4.2–5.4)
SODIUM SERPL-SCNC: 133 MMOL/L (ref 135–145)
WBC # BLD AUTO: 5.7 K/UL (ref 4.8–10.8)

## 2019-06-13 PROCEDURE — 80048 BASIC METABOLIC PNL TOTAL CA: CPT

## 2019-06-13 PROCEDURE — A9270 NON-COVERED ITEM OR SERVICE: HCPCS | Performed by: INTERNAL MEDICINE

## 2019-06-13 PROCEDURE — 92526 ORAL FUNCTION THERAPY: CPT

## 2019-06-13 PROCEDURE — 97116 GAIT TRAINING THERAPY: CPT

## 2019-06-13 PROCEDURE — 36415 COLL VENOUS BLD VENIPUNCTURE: CPT

## 2019-06-13 PROCEDURE — 770020 HCHG ROOM/CARE - TELE (206)

## 2019-06-13 PROCEDURE — 99232 SBSQ HOSP IP/OBS MODERATE 35: CPT | Performed by: HOSPITALIST

## 2019-06-13 PROCEDURE — 85025 COMPLETE CBC W/AUTO DIFF WBC: CPT

## 2019-06-13 PROCEDURE — 700102 HCHG RX REV CODE 250 W/ 637 OVERRIDE(OP): Performed by: INTERNAL MEDICINE

## 2019-06-13 PROCEDURE — 700111 HCHG RX REV CODE 636 W/ 250 OVERRIDE (IP): Performed by: HOSPITALIST

## 2019-06-13 PROCEDURE — 97530 THERAPEUTIC ACTIVITIES: CPT

## 2019-06-13 PROCEDURE — 97110 THERAPEUTIC EXERCISES: CPT

## 2019-06-13 RX ADMIN — ENOXAPARIN SODIUM 40 MG: 100 INJECTION SUBCUTANEOUS at 05:40

## 2019-06-13 RX ADMIN — ASPIRIN 81 MG 324 MG: 81 TABLET ORAL at 00:04

## 2019-06-13 RX ADMIN — ATORVASTATIN CALCIUM 40 MG: 40 TABLET, FILM COATED ORAL at 17:10

## 2019-06-13 ASSESSMENT — GAIT ASSESSMENTS
DISTANCE (FEET): 75
GAIT LEVEL OF ASSIST: MINIMAL ASSIST
DEVIATION: BRADYKINETIC;SHUFFLED GAIT

## 2019-06-13 ASSESSMENT — ENCOUNTER SYMPTOMS
HEADACHES: 1
DIARRHEA: 0
CONSTIPATION: 0
SHORTNESS OF BREATH: 0
COUGH: 0
DIZZINESS: 0
VOMITING: 0
PALPITATIONS: 0
FEVER: 0
MYALGIAS: 0
CHILLS: 0
WEAKNESS: 0
ABDOMINAL PAIN: 0
FOCAL WEAKNESS: 1
NAUSEA: 0

## 2019-06-13 ASSESSMENT — COGNITIVE AND FUNCTIONAL STATUS - GENERAL
STANDING UP FROM CHAIR USING ARMS: A LITTLE
CLIMB 3 TO 5 STEPS WITH RAILING: A LITTLE
SUGGESTED CMS G CODE MODIFIER MOBILITY: CK
WALKING IN HOSPITAL ROOM: A LITTLE
MOBILITY SCORE: 18
MOVING TO AND FROM BED TO CHAIR: A LITTLE
TURNING FROM BACK TO SIDE WHILE IN FLAT BAD: A LITTLE
MOVING FROM LYING ON BACK TO SITTING ON SIDE OF FLAT BED: A LITTLE

## 2019-06-13 NOTE — DISCHARGE PLANNING
Anticipated Discharge Disposition: TBD    Action: Day 4 of admission. Pt new to unit. LSW met with pt and family at bedside. Pt's brother translated for pt and answered most of the questions. Pt just moved to live with her sister 1 month ago from [facility]. Pt is not a citizen. Pt's sister lives in a first level apt. No steps. LSW asked if pt would like to add an EC and pt refused at this time. Pt did state that her sister's number is [phone]. Pt denied substance abuse and hx of . Pt has no insurance & is under [employer] which only assists with inpatient stay. No RX coverage. Pt's dc goal is to return to her sister's home with her family and friend support.    Barriers to Discharge: no insurance, not a citizen, language, medical clearance    Plan: assist with dc.     Care Transition Team Assessment    Information Source  Orientation : Oriented x 4  Information Given By:  (Pt's brother)  Informant's Name: Son, Daughter and Granddaughter  Who is responsible for making decisions for patient? : Patient    Readmission Evaluation  Is this a readmission?: No    Elopement Risk  Legal Hold: No  Ambulatory or Self Mobile in Wheelchair: Yes  Disoriented: No  Psychiatric Symptoms: None  History of Wandering: No  Elopement this Admit: No  Vocalizing Wanting to Leave: No  Displays Behaviors, Body Language Wanting to Leave: No-Not at Risk for Elopement  Elopement Risk: Not at Risk for Elopement    Interdisciplinary Discharge Planning  Does Admitting Nurse Feel This Could be a Complex Discharge?: Yes  Primary Care Physician[place]   Lives with - Patient's Self Care Capacity: Adult Children  Patient or legal guardian wants to designate a caregiver (see row info): No  Support Systems: Children  Housing / Facility: Unable To Determine At This Time  Do You Take your Prescribed Medications Regularly: Yes  Able to Return to Previous ADL's: No  Mobility Issues: Yes  Prior Services:  Unable To Determine At This Time  Assistance Needed: Unknown at this Time  Durable Medical Equipment: Unknown    Discharge Preparedness  What is your plan after discharge?: Uncertain - pending medical team collaboration  What are your discharge supports?: Child, Sibling  Prior Functional Level: Ambulatory, Independent with Activities of Daily Living, Independent with Medication Management  Difficulity with ADLs: Bathing, Toileting, Walking  Difficulity with IADLs: Cooking, Driving, Laundry    Functional Assesment  Prior Functional Level: Ambulatory, Independent with Activities of Daily Living, Independent with Medication Management    Finances  Financial Barriers to Discharge: Yes  Prescription Coverage: No    Vision / Hearing Impairment  Vision Impairment : Yes  Right Eye Vision: Impaired, Wears Glasses  Left Eye Vision: Impaired, Wears Glasses  Hearing Impairment : No         Advance Directive  Advance Directive?: None  Advance Directive offered?: AD Booklet refused    Domestic Abuse  Have you ever been the victim of abuse or violence?: No  Physical Abuse or Sexual Abuse: No  Verbal Abuse or Emotional Abuse: No  Possible Abuse Reported to:: Not Applicable    Psychological Assessment  History of Substance Abuse: None  History of Psychiatric Problems: No  Non-compliant with Treatment: No  Newly Diagnosed Illness: Yes    Discharge Risks or Barriers  Discharge risks or barriers?: No PCP, Uninsured / underinsured, Complex medical needs, Other (comment) (undocumented. not a citizen)  Patient risk factors: Complex medical needs, Language barrier, Uninsured or underinsured    Anticipated Discharge Information  Anticipated discharge disposition: Anticoagulation tx, Discharge needs currently unknown

## 2019-06-13 NOTE — PROGRESS NOTES
Assumed pt care at 1900 and received bedside report.    Pt alert and oriented X 4. Pt denies chest pain, sob, nausea and vomiting, headache, and blurry or double vision. Pt denies numbness and tingling. Pt denies pain. Pt ambulating x1 hand held assist to restroom. Tele monitor in place.   POC discussed and education provided on administered medications. All questions and concerns addressed. Fall precautions, hourly rounding and Q4 hour neuro checks in place.     2040: Pt c/o headache and nausea.VSS. Pt medicated with PRN tylenol and compazine. Neuro check performed, no neuro changes. On call hospitalist Dr Echevarria updated of acute headache/nausea. No new orders received.

## 2019-06-13 NOTE — THERAPY
"Physical Therapy Treatment completed.   Bed Mobility:  Supine to Sit: Supervised  Transfers: Sit to Stand: Supervised  Gait: Level Of Assist: Minimal Assist with No Equipment Needed       Plan of Care: Will benefit from Physical Therapy 3 times per week  Discharge Recommendations: Equipment: No Equipment Needed.     See \"Rehab Therapy-Acute\" Patient Summary Report for complete documentation.     Pt progressing well w/ therapy. Pt demonstrating improved strength for mobility. She was able to perform most mobility at a SPV-Hossein level. Pt states that at baseline she does not ambulat much. She states that she normally only \"walks on couch\" (furniture crawls) to get around. She states that dtr is always home and able to assist. Pt is at a level in which she can DC home w/ 24/7 SPV and HH.  "

## 2019-06-13 NOTE — PROGRESS NOTES
Monitor summary: SR 62-91, CO 0.16, QRS 0.08, QT 0.36, with rare PVC's per strip from monitor room.

## 2019-06-13 NOTE — CARE PLAN
Problem: Safety  Goal: Will remain free from falls  Outcome: PROGRESSING AS EXPECTED  Bed alarm on, bed locked in lowest position, upper side rails up, call light and personal items within reach, non skids socks on.    Problem: Knowledge Deficit  Goal: Knowledge of the prescribed therapeutic regimen will improve  Outcome: PROGRESSING AS EXPECTED  POC discussed with patient, all questions answered.

## 2019-06-13 NOTE — PROGRESS NOTES
Monitor summary: sinus rhythm/sinus tach rate , MA .16, QRS .08, QT .36, with rate PVC and rare PAC's per strip from monitor room.

## 2019-06-13 NOTE — THERAPY
"Speech Language Therapy dysphagia treatment completed.   Functional Status:  Patient/family declining  at this time. Family was able to bring in patient's dentures. She required assistance putting them in as she was unaware her left upper lip was getting caught on them. PO trials of thin liquid, puree and soft solids were given. She had left sided labial spillage and mild oral residue on the left during soft chopped solids. She was unaware of oral residue but felt the fruit shoot out of her mouth and onto her gown. Throat clearing was noted 75% of the time with small sips of thin liquids. No difficulty observed with puree. Family reports patient says \"everything is fine.\" The patient is reporting that she had a stroke while in [facility] that made her face feel funny and gave her headache. Query if symptoms are baseline. However, the patient's son reported that she eats very slow at his house but he does not remember her having food fall out of her mouth. Labial and lingual exercises and strategies completed. The patient was given soft chopped solids again with the instruction to keep her lips tightly sealed and to check for pocketing. No further labial spillage noted but she continued to have oral residue 50% of the time. Moderate cues required to clear. Recommend upgrade to Dysphagia 1, NTL    Recommendations: Dysphagia 1, NTL. HOB at 90 degrees. Crush large pills   Plan of Care: Will benefit from Speech Therapy 5 times per week  Post-Acute Therapy: Recommend inpatient transitional care services for continued speech therapy services.        See \"Rehab Therapy-Acute\" Patient Summary Report for complete documentation.     "

## 2019-06-13 NOTE — CARE PLAN
Problem: Safety  Goal: Will remain free from injury  Outcome: PROGRESSING AS EXPECTED  Bed/chair alarm on patient educated to call for assistance.     Problem: Mobility  Goal: Risk for activity intolerance will decrease  Outcome: PROGRESSING AS EXPECTED  Up to bathroom SBA, up to chair for meals.

## 2019-06-13 NOTE — PROGRESS NOTES
Hospital Medicine Daily Progress Note    Date of Service  6/13/2019    Chief Complaint  Left side weakness and aphasia    Hospital Course    72yo Khmer-speaking F (Adrienne Jordan from [facility]) admitted 6/9/2019 with left side weakness and aphasia at 2200 6/9.  TPA was given at 23:45 and patient was taken for mechanical thrombectomy.       Interval Problem Update  6/13 - transferred out of the ICU. Anxious but understands plan. Therapy initially recommended placement but she is doing very well now. Discussed on rounds with SW.     6/12 patient overall slowly improving, MRI with significant right MCA CVA on MRI, echocardiogram is overall benign, favorable, mild right-sided headache persists and left-sided weakness.  Patient able to ambulate some to the commode with assistance, telemetry without evidence of current arrhythmia.  Thought likely to have occult arrhythmia/cryptic stroke scenario at this time    6/11 patient overall stabilized and improved, has continued facial droop and slight weakness, she is mentating normally, family at the bedside.  Alert and oriented x4, some ataxia, left-sided weakness.  Mild right-sided headache.    Consultants/Specialty  Neurology, Dr Steen  Pulmonary    Code Status  FULL    Disposition  Pending    Review of Systems  Review of Systems   Constitutional: Negative for chills and fever.   Respiratory: Negative for cough and shortness of breath.    Cardiovascular: Negative for chest pain and palpitations.   Gastrointestinal: Negative for abdominal pain, constipation, diarrhea, nausea and vomiting.   Genitourinary: Negative for dysuria.   Musculoskeletal: Negative for myalgias.   Skin: Negative for itching.   Neurological: Positive for focal weakness and headaches. Negative for dizziness and weakness.   All other systems reviewed and are negative.       Physical Exam  Temp:  [36.1 °C (96.9 °F)-37.1 °C (98.7 °F)] 37.1 °C (98.7 °F)  Pulse:  [64-93] 70  Resp:  [16-18] 16  BP:  (103-146)/(62-87) 127/84  SpO2:  [93 %-96 %] 96 %    Physical Exam   Constitutional: She is oriented to person, place, and time. She appears well-developed and well-nourished. No distress.   HENT:   Head: Normocephalic and atraumatic.   Eyes: Pupils are equal, round, and reactive to light. Conjunctivae are normal. No scleral icterus.   Neck: Normal range of motion. Neck supple.   Cardiovascular: Normal rate, regular rhythm, normal heart sounds and intact distal pulses.    No murmur heard.  Pulmonary/Chest: Effort normal and breath sounds normal. No respiratory distress. She has no rales.   Abdominal: Soft. Bowel sounds are normal. She exhibits no distension.   Musculoskeletal: Normal range of motion. She exhibits no edema.   No apparent weakness in upper extremities on my exam   Neurological: She is alert and oriented to person, place, and time.   Skin: Skin is warm and dry.   Vitals reviewed.      Fluids    Intake/Output Summary (Last 24 hours) at 06/13/19 1656  Last data filed at 06/13/19 1200   Gross per 24 hour   Intake              480 ml   Output                0 ml   Net              480 ml       Laboratory  Recent Labs      06/11/19   0505  06/12/19   0414  06/13/19   0254   WBC  6.5  6.1  5.7   RBC  4.26  4.23  4.33   HEMOGLOBIN  12.2  12.4  12.5   HEMATOCRIT  37.6  37.1  38.3   MCV  88.3  87.7  88.5   MCH  28.6  29.3  28.9   MCHC  32.4*  33.4*  32.6*   RDW  43.8  42.7  42.5   PLATELETCT  412  399  446   MPV  10.0  10.2  10.3     Recent Labs      06/11/19   0505  06/12/19   0414  06/13/19   0254   SODIUM  137  135  133*   POTASSIUM  4.0  4.0  3.9   CHLORIDE  105  103  100   CO2  23  22  23   GLUCOSE  92  99  94   BUN  14  19  25*   CREATININE  1.11  1.18  1.30   CALCIUM  9.1  9.7  9.8         Recent Labs      06/12/19   0414   BNPBTYPENAT  19           Imaging  EC-ECHOCARDIOGRAM COMPLETE W/O CONT   Final Result      EC-ECHOCARDIOGRAM COMPLETE W/ CONT   Final Result      MR-BRAIN-W/O   Final Result      1.   Moderately extensive acute cerebral infarction in the right MCA territory primarily perisylvian/right frontal operculum. Additional small area of infarction involving the right posterior putamen extending up into the right posterior frontal corona    radiata.   2.  No hemorrhagic transformation.   3.  Moderate area of chronic encephalomalacia with hemosiderin deposition in the right cerebellar hemisphere superiorly consistent with old hemorrhage or old hemorrhagic infarct.      IR-THROMBO MECHANICAL ARTERY,INIT   Final Result      1. Occlusion of a RIGHT M2 segment.   2. Successful RIGHT middle cerebral artery mechanical thrombectomy using aspiration thrombectomy.   3. Post thrombectomy arteriogram demonstrates patent lenticulostriate, RIGHT M1 and improved flow in the previously occluded RIGHT M2 segment.         CT-HEAD W/O   Final Result         1.  No acute intracranial abnormality is identified, mild atrophy is noted.   2.  Subtle low-density changes in the right frontal lobe, new since prior study, appearance most compatible with evolving infarct.      DX-CHEST-PORTABLE (1 VIEW)   Final Result         1.  Interstitial bilateral pulmonary opacities suggests interstitial edema and/or infiltrate.   2.  Atherosclerosis      CT-CTA NECK WITH & W/O-POST PROCESSING   Final Result         1.  Hypoplastic left vertebral artery, otherwise CT angiogram of the neck within normal limits.      CT-CTA HEAD WITH & W/O-POST PROCESS   Final Result         1.  Distal right M1 occlusion   2.  Hypoplastic left A1   3.  Persistent fetal morphology of the right posterior cerebral artery.      These findings were discussed with the patient's clinician, MIKEY ONEAL, on 6/9/2019 11:23 PM.      CT-CEREBRAL PERFUSION ANALYSIS   Final Result         1.  Cerebral blood flow less than 30% likely representing completed infarct = 50 mL.      2.  T Max more than 6 seconds likely representing combination of completed infarct and ischemia  = 99 mL.      3.  Mismatched volume likely representing ischemic brain/penumbra = 49 mL      4.  Please note that the cerebral perfusion was performed on the limited brain tissue around the basal ganglia region. Infarct/ischemia outside the CT perfusion sections can be missed in this study.      CT-HEAD W/O   Final Result         1.  No acute intracranial abnormality is identified, there are nonspecific white matter changes, commonly associated with small vessel ischemic disease.  Associated mild cerebral atrophy is noted.           Assessment/Plan  Stroke (HCC)- (present on admission)   Assessment & Plan    Status post TPA at 11:45 PM on 6/9/2019   CTA head with IV contrast reveals distal right M1 occlusion, patient will be taken to IR for mechanical thrombectomy  statin and aspirin  Neurology consulting  PT/OT/ST  Normal TSH  May need loop recorder; depends on whether she stays in the  or goes back to [facility]     Dyslipidemia- (present on admission)   Assessment & Plan    Elevated triglycerides and LDL  Initiated on high intensity statin     Hypertension- (present on admission)   Assessment & Plan    SBP goal less than 140 mmHg  DBP goal less than 90 mmHg  PRN and antihypertensives to titrate by hospitalist towards goal  Most recent Blood Pressure : 127/84          VTE prophylaxis: SCD's

## 2019-06-14 ENCOUNTER — PATIENT OUTREACH (OUTPATIENT)
Dept: HEALTH INFORMATION MANAGEMENT | Facility: OTHER | Age: 74
End: 2019-06-14

## 2019-06-14 VITALS
BODY MASS INDEX: 23.37 KG/M2 | WEIGHT: 126.98 LBS | OXYGEN SATURATION: 96 % | HEART RATE: 89 BPM | DIASTOLIC BLOOD PRESSURE: 72 MMHG | HEIGHT: 62 IN | SYSTOLIC BLOOD PRESSURE: 109 MMHG | TEMPERATURE: 98.7 F | RESPIRATION RATE: 17 BRPM

## 2019-06-14 PROBLEM — E78.5 DYSLIPIDEMIA: Status: RESOLVED | Noted: 2019-06-10 | Resolved: 2019-06-14

## 2019-06-14 LAB
ANION GAP SERPL CALC-SCNC: 9 MMOL/L (ref 0–11.9)
BASOPHILS # BLD AUTO: 0.6 % (ref 0–1.8)
BASOPHILS # BLD: 0.03 K/UL (ref 0–0.12)
BUN SERPL-MCNC: 23 MG/DL (ref 8–22)
CALCIUM SERPL-MCNC: 9.4 MG/DL (ref 8.5–10.5)
CHLORIDE SERPL-SCNC: 103 MMOL/L (ref 96–112)
CO2 SERPL-SCNC: 23 MMOL/L (ref 20–33)
CREAT SERPL-MCNC: 1.28 MG/DL (ref 0.5–1.4)
EOSINOPHIL # BLD AUTO: 0.08 K/UL (ref 0–0.51)
EOSINOPHIL NFR BLD: 1.5 % (ref 0–6.9)
ERYTHROCYTE [DISTWIDTH] IN BLOOD BY AUTOMATED COUNT: 40.4 FL (ref 35.9–50)
GLUCOSE SERPL-MCNC: 92 MG/DL (ref 65–99)
HCT VFR BLD AUTO: 38 % (ref 37–47)
HGB BLD-MCNC: 12.7 G/DL (ref 12–16)
IMM GRANULOCYTES # BLD AUTO: 0.01 K/UL (ref 0–0.11)
IMM GRANULOCYTES NFR BLD AUTO: 0.2 % (ref 0–0.9)
LYMPHOCYTES # BLD AUTO: 2.1 K/UL (ref 1–4.8)
LYMPHOCYTES NFR BLD: 38.6 % (ref 22–41)
MCH RBC QN AUTO: 29.1 PG (ref 27–33)
MCHC RBC AUTO-ENTMCNC: 33.4 G/DL (ref 33.6–35)
MCV RBC AUTO: 87 FL (ref 81.4–97.8)
MONOCYTES # BLD AUTO: 0.53 K/UL (ref 0–0.85)
MONOCYTES NFR BLD AUTO: 9.7 % (ref 0–13.4)
NEUTROPHILS # BLD AUTO: 2.69 K/UL (ref 2–7.15)
NEUTROPHILS NFR BLD: 49.4 % (ref 44–72)
NRBC # BLD AUTO: 0 K/UL
NRBC BLD-RTO: 0 /100 WBC
PLATELET # BLD AUTO: 450 K/UL (ref 164–446)
PMV BLD AUTO: 10.1 FL (ref 9–12.9)
POTASSIUM SERPL-SCNC: 3.8 MMOL/L (ref 3.6–5.5)
RBC # BLD AUTO: 4.37 M/UL (ref 4.2–5.4)
SODIUM SERPL-SCNC: 135 MMOL/L (ref 135–145)
WBC # BLD AUTO: 5.4 K/UL (ref 4.8–10.8)

## 2019-06-14 PROCEDURE — 90471 IMMUNIZATION ADMIN: CPT

## 2019-06-14 PROCEDURE — 700111 HCHG RX REV CODE 636 W/ 250 OVERRIDE (IP): Performed by: HOSPITALIST

## 2019-06-14 PROCEDURE — 3E0234Z INTRODUCTION OF SERUM, TOXOID AND VACCINE INTO MUSCLE, PERCUTANEOUS APPROACH: ICD-10-PCS | Performed by: HOSPITALIST

## 2019-06-14 PROCEDURE — 92526 ORAL FUNCTION THERAPY: CPT

## 2019-06-14 PROCEDURE — A9270 NON-COVERED ITEM OR SERVICE: HCPCS | Performed by: INTERNAL MEDICINE

## 2019-06-14 PROCEDURE — 700102 HCHG RX REV CODE 250 W/ 637 OVERRIDE(OP): Performed by: INTERNAL MEDICINE

## 2019-06-14 PROCEDURE — 36415 COLL VENOUS BLD VENIPUNCTURE: CPT

## 2019-06-14 PROCEDURE — 80048 BASIC METABOLIC PNL TOTAL CA: CPT

## 2019-06-14 PROCEDURE — 700102 HCHG RX REV CODE 250 W/ 637 OVERRIDE(OP): Performed by: HOSPITALIST

## 2019-06-14 PROCEDURE — 90670 PCV13 VACCINE IM: CPT | Performed by: HOSPITALIST

## 2019-06-14 PROCEDURE — 85025 COMPLETE CBC W/AUTO DIFF WBC: CPT

## 2019-06-14 PROCEDURE — A9270 NON-COVERED ITEM OR SERVICE: HCPCS | Performed by: HOSPITALIST

## 2019-06-14 PROCEDURE — 99239 HOSP IP/OBS DSCHRG MGMT >30: CPT | Performed by: HOSPITALIST

## 2019-06-14 RX ORDER — ATORVASTATIN CALCIUM 40 MG/1
40 TABLET, FILM COATED ORAL EVERY EVENING
Qty: 30 TAB | Refills: 2 | Status: SHIPPED | OUTPATIENT
Start: 2019-06-14

## 2019-06-14 RX ADMIN — PNEUMOCOCCAL 13-VALENT CONJUGATE VACCINE 0.5 ML: 2.2; 2.2; 2.2; 2.2; 2.2; 4.4; 2.2; 2.2; 2.2; 2.2; 2.2; 2.2; 2.2 INJECTION, SUSPENSION INTRAMUSCULAR at 18:40

## 2019-06-14 RX ADMIN — ACETAMINOPHEN 650 MG: 325 TABLET, FILM COATED ORAL at 14:52

## 2019-06-14 RX ADMIN — ATORVASTATIN CALCIUM 40 MG: 40 TABLET, FILM COATED ORAL at 17:36

## 2019-06-14 RX ADMIN — ENOXAPARIN SODIUM 40 MG: 100 INJECTION SUBCUTANEOUS at 04:21

## 2019-06-14 RX ADMIN — ASPIRIN 325 MG: 325 TABLET, FILM COATED ORAL at 00:04

## 2019-06-14 NOTE — CARE PLAN
Problem: Communication  Goal: The ability to communicate needs accurately and effectively will improve  Outcome: PROGRESSING AS EXPECTED  Encourage use of the call light, encourage pt to voice feelings, assess pt needs hourly including pain and toileting, provide interpretative services as needed.   Utilizing Sinhala speaking  when necessary.    Problem: Safety  Goal: Will remain free from injury  Outcome: PROGRESSING AS EXPECTED  Pt educated on ambulation and fall risks, collaboration with PT/OT, all ambulation devices out of sight while not in use, proper signage hung, fall precautions in place.

## 2019-06-14 NOTE — PROGRESS NOTES
Assumed care at 0700. Pt AOx4, denies any chest pain, SOB, headache, numbness/tingling, and pain. Pt ambulates with a standby assist. Pt is Setswana speaking, and an  was used throughout the shift as needed. Q4 neuro checks in place, hourly rounding in place. Plan to discharge today.

## 2019-06-14 NOTE — CARE PLAN
Problem: Acute Care of the Stroke Patient  Goal: Optimal Outcome for the Stroke patient    Intervention: NIHSS completed and documented  NIHSS completed at shift change.

## 2019-06-14 NOTE — DISCHARGE SUMMARY
Discharge Summary    CHIEF COMPLAINT ON ADMISSION  Chief Complaint   Patient presents with   • Possible Stroke       Reason for Admission  STROKE     Admission Date  6/9/2019    CODE STATUS  Full Code    HPI & HOSPITAL COURSE    72yo Latvian-speaking F (Adrienne Jordan from [facility]) admitted 6/9/2019 with left side weakness and aphasia at 2200 6/9.  TPA was given at 23:45 and patient was taken for mechanical thrombectomy.   She was admitted and started on aspirin and statin. She had good improvement in her symptoms and was anxious to return home. Her admitting complaint resolved as she returned to her previous baseline.    Therefore, she is discharged in good and stable condition to home with close outpatient follow-up.    The patient met 2-midnight criteria for an inpatient stay at the time of discharge.    Discharge Date  06/14/19       DISCHARGE DIAGNOSES  Active Problems:    Stroke (HCC) POA: Yes    Hypertension POA: Yes  Resolved Problems:    Dyslipidemia POA: Yes      FOLLOW UP  No future appointments.  No follow-up provider specified.    MEDICATIONS ON DISCHARGE     Medication List      START taking these medications      Instructions   aspirin EC 81 MG Tbec  Commonly known as:  ECOTRIN   Take 1 Tab by mouth every day.  Dose:  81 mg     atorvastatin 40 MG Tabs  Commonly known as:  LIPITOR   Take 1 Tab by mouth every evening.  Dose:  40 mg            Allergies  No Known Allergies    DIET  Orders Placed This Encounter   Procedures   • Diet Order Regular     Standing Status:   Standing     Number of Occurrences:   1     Order Specific Question:   Diet:     Answer:   Regular [1]     Order Specific Question:   Texture/Fiber modifications:     Answer:   Dysphagia 1(Pureed)specify fluid consistency(question 6) [1]     Order Specific Question:   Consistency/Fluid modifications:     Answer:   Nectar Thick [2]     Order Specific Question:   Miscellaneous modifications:     Answer:   SLP - Deliver to Nursing Station  [22]       ACTIVITY  As tolerated.  Weight bearing as tolerated    CONSULTATIONS  Hannah Francisco MD - neurology  Madhu Wen MD - critical care  Radha Zhang MD - physiatry    PROCEDURES  PostOp Diagnosis: RIGHT M2 occlusion   Procedure(s): arteriography, stroke thrombectomy, TICI 2B result   Estimated Blood Loss: Less than 5 ml   Complications: None   6/10/2019     1:30 AM     Irving Armstrong    LABORATORY  Lab Results   Component Value Date    SODIUM 135 06/14/2019    POTASSIUM 3.8 06/14/2019    CHLORIDE 103 06/14/2019    CO2 23 06/14/2019    GLUCOSE 92 06/14/2019    BUN 23 (H) 06/14/2019    CREATININE 1.28 06/14/2019        Lab Results   Component Value Date    WBC 5.4 06/14/2019    HEMOGLOBIN 12.7 06/14/2019    HEMATOCRIT 38.0 06/14/2019    PLATELETCT 450 (H) 06/14/2019        Total time of the discharge process exceeds 35 minutes.

## 2019-06-14 NOTE — DISCHARGE PLANNING
Anticipated Discharge Disposition: Home    Action: Pt was discussed in IDT rounds. Per MD pt can dc home no needs. Pt lives with her sister & has great family support.     [facility] Per representative, Aspirin will be no cost and for atorvastatin 40MG the cost is $8.96. LSW informed Bedside RN.     Barriers to Discharge: none    Plan: pt set to dc home.

## 2019-06-14 NOTE — DISCHARGE INSTRUCTIONS
Discharge Instructions    Discharged to home by car with relative. Discharged via walking, hospital escort: Yes.  Special equipment needed: Not Applicable    Be sure to schedule a follow-up appointment with your primary care doctor or any specialists as instructed.     Discharge Plan:   Pneumococcal Vaccine Administered/Refused: Not given - Patient refused pneumococcal vaccine  Influenza Vaccine Indication: Patient Refuses    I understand that a diet low in cholesterol, fat, and sodium is recommended for good health. Unless I have been given specific instructions below for another diet, I accept this instruction as my diet prescription.   Other diet: Dysphagia 2 with nectar thick liquids.    Special Instructions:     Stroke/CVA/TIA/Hemorrhagic Ischemia Discharge Instructions  You have had a stroke. Your risk factors have been identified as follows:  Ethnicity - -Americans and Hispanics are at a higher risk  High Cholesterol and lipids  It is important that you reduce your risk factors to avoid another stroke in the future. Here are some general guidelines to follow:  · Eat healthy - avoid food high in fat.  · Get regular exercise.  · Maintain a healthy weight.  · Avoid smoking.  · Avoid alcohol and illegal drug use.  · Take your medications as directed.  For more information regarding risk factors, refer to pages 17-19 in your Stroke Patient Education Guide. Stroke Education Guide was given to patient.    Warning signs of a stroke include (which can also be found on page 3 of your Stroke Patient Education Guide):  · Sudden numbness of weakness of the face, arm or leg (especially on one side of the body).  · Sudden confusion, trouble speaking or understanding.  · Sudden trouble seeing in one or both eyes.  · Sudden trouble walking, dizziness, loss of balance or coordination.  · Sudden severe headache with no known cause.  It is very important to get treatment quickly when a stroke occurs. If you experience any  of the above warning signs, call [phone] immediately.     Some patients who have had a stroke will be going home on a blood thinner medication called Warfarin (Coumadin).  This medication requires very close monitoring and follow up.  This follow up can be provided by either your Primary Care Physician or by [facility].  The Outpatient Anticoagulation Service is located at the [facility] ([facility]).  If you do not know when your follow up appointment is scheduled, call [phone] to verify your appointment time.    Depression / Suicide Risk    As you are discharged from this RUST, it is important to learn how to keep safe from harming yourself.    Recognize the warning signs:  · Abrupt changes in personality, positive or negative- including increase in energy   · Giving away possessions  · Change in eating patterns- significant weight changes-  positive or negative  · Change in sleeping patterns- unable to sleep or sleeping all the time   · Unwillingness or inability to communicate  · Depression  · Unusual sadness, discouragement and loneliness  · Talk of wanting to die  · Neglect of personal appearance   · Rebelliousness- reckless behavior  · Withdrawal from people/activities they love  · Confusion- inability to concentrate     If you or a loved one observes any of these behaviors or has concerns about self-harm, here's what you can do:  · Talk about it- your feelings and reasons for harming yourself  · Remove any means that you might use to hurt yourself (examples: pills, rope, extension cords, firearm)  · Get professional help from the community (Mental Health, Substance Abuse, psychological counseling)  · Do not be alone:Call your Safe Contact- someone whom you trust who will be there for you.  · Call your local CRISIS HOTLINE 267-1422 or 975-640-4195  · Call your local Children's Mobile Crisis  Response Team [facility] (800) 093-1133 or www.[employer]  · Call the toll free National Suicide Prevention Hotlines   · National Suicide Prevention Lifeline 586-599-JXUM (0280)  · National Ensenda Line Network 800-SUICIDE (231-7402)      Dieta para la disfagia con líquidos espesados    Si tiene que seguir vandana dieta para la disfagia, es posible que deba espesar las bebidas, sopas, alimentos que se descongelan a temperatura ambiente y otros líquidos antes de ingerirlos. Los alimentos espesados son más fáciles de tragar. Reducen el riesgo de que el líquido pase a los pulmones.  Para preparar un líquido espesado debe agregar un espesante comercial o un alimento blando al líquido hasta lograr la consistencia deseada. El médico o nutricionista le explicará cuál es la consistencia que debe lograr. Las consistencias de los líquidos pueden ser:  · Teresa. Los líquidos wm incluyen la mayoría de las bebidas (por ejemplo, agua, leche, té, refrescos, jugos, gaseosas) además de helados de crema, sorbetes, helados de agua y sopas a base de caldo.  · [place] néctar. Los líquidos andrea néctar son el jarabe de christianson y las sopas cremosas.  · [place] miel. Los líquidos andrea miel deben ser líquidos juan luis espesos andrea la miel. Estos líquidos no pueden beberse con un sorbete.  · Espesa que se puede marjan con vandana cuchara. Estos son líquidos espesos que se pueden marjan con vandana cuchara, andrea un pudin.  MI PLAN  Mary espesar los líquidos para que tengan vandana consistencia andrea nectar.  PAUTAS PARA LA DIETA  · Espese los líquidos según la consistencia indicada por bliss médico.  · Siga las recomendaciones de bliss médico o nutricionista sobre cómo espesar los líquidos.  · Consulte al médico o nutricionista con frecuencia para que lo ayude a modificar bliss dieta.  ¿CÓMO ESPESAR LOS LÍQUIDOS?  Puede espesar los líquidos con un espesante comercial para alimentos y bebidas o con alimentos blandos.  Espesantes comerciales para alimentos y bebidas   Un  espesante para alimentos y bebidas es un polvo o gel que hace más espeso el alimento o la bebida. Los espesantes se venden en farmacias, tiendas de insumos médicos, algunas tiendas de comestibles y en Internet. Pueden agregarse a líquidos fríos o calientes y no modifican el sabor del líquido. Solicite vandana lista de espesantes comerciales a bliss médico o nutricionista.  Cada espesante es diferente. Algunos se mezclan con el líquido usando vandana licuadora, mientras que otros se revuelven en el líquido con vandana cuchara o un tenedor. Siga las indicaciones que aparecen en la etiqueta del espesante.  Alimentos blandos   Algunos alimentos andrea sopas, guisados y salsas pueden espesarse con alimentos blandos. Los alimentos blandos son:  · Cereales infantiles.  · Salsa en polvo.  · Puré de angelic.  · Papilla.  · Puré de angelic instantáneo.  · Mezclas para salsas en polvo (por ejemplo, mezclas de queso).  · Harina.  Para usar mariluz de estos alimentos blandos, revuélvalo o mézclelo en el líquido gagandeep hasta lograr la consistencia deseada. Comience con vandana pequeña cantidad y ajuste la cantidad de alimento blando y líquido según sea necesario.  Nota: La harina funciona mejor con los líquidos calientes, andrea el caldo. Para espesar un líquido con harina, forme vandana pasta con harina y agua. Cocine o caliente el líquido y agréguele la pasta. Revuelva hasta que se mezcle todo.  CONSEJOS PARA ESPESAR LÍQUIDOS FÁCILMENTE  · Cuando viaje o coma fuera de bliss casa, lleve los espesantes consigo.  · Si un líquido se vuelve muy espeso, agréguele más líquido hasta lograr la consistencia deseada.  · Considere comprar las bebidas que se venden ya espesadas.  · Considere usar un espesante para preparar tory propios postres helados.  Esta información no tiene andrea fin reemplazar el consejo del médico. Asegúrese de hacerle al médico cualquier pregunta que tenga.  Document Released: 09/11/2013 Document Revised: 12/23/2014 Document Reviewed: 12/01/2014  [place]  Interactive Patient Education © 2017 Elsevier Inc.      Colesterol elevado  (High Cholesterol)  El término colesterol elevado hace referencia a vandana concentración annika de colesterol en la lillian. El colesterol es vandana proteína anand y cerosa parecida a la grasa que el organismo necesita en pequeñas cantidades. El hígado fabrica todo el colesterol que necesita. El exceso de colesterol proviene de los alimentos que come.  El colesterol viaja por el torrente sanguíneo hasta los vasos sanguíneos. Si tiene el colesterol elevado, federico puede depositarse (placa) en las maya de los vasos sanguíneos, lo que ocasiona el estrechamiento y la rigidez de las arterias. La placa aumenta el riesgo de ataque cardíaco y de ictus. Trabaje con el médico para mantener las concentraciones de colesterol en un rango saludable.  FACTORES DE RIESGO  Existen varios factores que pueden aumentar la probabilidad de que tenga colesterol elevado. Estos incluyen:   · Consumir alimentos con alto contenido de grasa animal (grasa saturada) o colesterol.  · Tener sobrepeso.  · No hacer suficiente ejercicio físico.  · Tener antecedentes familiares de colesterol elevado.  SIGNOS Y SÍNTOMAS  El colesterol elevado no causa síntomas.  DIAGNÓSTICO   El médico puede realizar un análisis de lillian para controlar si tiene el colesterol elevado. Si es mayor de 20 años, el médico puede controlarle el colesterol cada 4 a 6 años. Los controles pueden ser más frecuentes si ya tuvo el colesterol elevado u otros factores de riesgo de enfermedades cardíacas. El análisis de lillian de colesterol mide lo siguiente:  · El colesterol juan (colesterol LDL), el tipo que causa enfermedades cardíacas. Federico valor debe estar por debajo de 100.  · El colesterol velasquez (colesterol HDL), el tipo que ayuda a brindar protección contra las enfermedades cardíacas. El nivel saludable de colesterol HDL es 60 o más.  · Colesterol total Es el valor combinado del colesterol LDL y el HDL. El  valor saludable es de menos de 200.  TRATAMIENTO   El colesterol elevado puede tratarse con cambios en la dieta y el estilo de valeria, y con medicamentos.   · Los cambios en la dieta pueden incluir la ingesta de vandana mayor cantidad de cereales integrales, frutas, verduras, anibal secos y pescado. Además, corona vez deba dejar de comer carne jessica y alimentos con gran cantidad de azúcares agregados.  · Los cambios en el estilo de valeria pueden incluir sesiones de ejercicios aeróbicos kavon por lo menos 40 minutos, crista veces por semana, entre ellos, caminar, andar en bicicleta y nadar. Los ejercicios aeróbicos junto con vandana dieta jacobo pueden ayudar a que se mantenga en un peso saludable. Los cambios en el estilo de valeria también pueden incluir dejar de fumar.  · Si los cambios en la dieta y el estilo de valeria no son suficientes para bajar el colesterol, el médico puede recetarle vandana estatina. Se ha demostrado que federico medicamento reduce el colesterol y, además, el riesgo de enfermedades cardíacas.  INSTRUCCIONES PARA EL CUIDADO EN EL HOGAR  · [place] los medicamentos de venta kenyon o recetados solamente según las indicaciones del médico.  · Lleve vandana dieta jacobo corona andrea el médico le indicó. Por ejemplo:  ¨ Coma alden (sin piel), pescado, ternera, mariscos, pechuga de pavo molida y mullen de carne jessica de pulpa o de lomo.  ¨ No coma comidas fritas y dion grasas, andrea salchichas y sara.  ¨ Coma muchas frutas, andrea manzanas.  ¨ Coma gran cantidad de verduras, andrea brócoli, angelic y zanahorias.  ¨ Coma porotos, guisantes secos y lentejas.  ¨ Coma cereales, andrea cebada, arroz, cuscus y cele burgol.  ¨ Coma pastas sin salsas con crema.  ¨ [place] leche semidescremada y sin grasa, y coma yogur y quesos bajos en grasas o sin grasa. No coma ni michaela leche entera, crema, helado, yemas de huevo y quesos duros.  ¨ No coma margarinas en elizabet ni untables que contengan grasas trans (que también se conocen andrea aceites parcialmente  hidrogenados).  ¨ No coma tortas, galletas, galletitas ni otros productos horneados que contengan grasas trans.  ¨ No coma aceites tropicales saturados, andrea el de mely y el de anthony.  · Meir ejercicio según las indicaciones del médico. Aumente el nivel de actividad, por ejemplo, meir jardinería o salga a caminar.  · Cumpla con todas las visitas de control.  SOLICITE ATENCIÓN MÉDICA SI:  · Tiene dificultad para seguir vandana dieta jacobo o mantener un peso saludable.  · Necesita ayuda para comenzar un programa de ejercicios.  · Necesita ayuda para dejar de fumar.  SOLICITE ATENCIÓN MÉDICA DE INMEDIATO SI:  · Siente dolor en el pecho.  · Tiene dificultad para respirar.  Esta información no tiene andrea fin reemplazar el consejo del médico. Asegúrese de hacerle al médico cualquier pregunta que tenga.  Document Released: 12/18/2006 Document Revised: 01/08/2016  [employer] Interactive Patient Education © 2017 [employer]    Ictus isquémico  (Ischemic Stroke)  Un ictus isquémico es la muerte súbita del tejido cerebral. La lillian transporta oxígeno a todas las zonas del cuerpo. Perlita tipo de ictus ocurre cuando el flujo sanguíneo hacia el cerebro no es normal. El cerebro no puede recibir el oxígeno que necesita. [place] es vandana emergencia. Se debe tratar a la persona de inmediato.  Los síntomas de un ictus, por lo general, ocurren de repente. Es posible que los advierta cuando se despierte. Pueden incluir los siguientes:  · Debilidad o pérdida de la sensibilidad en el gus, el brazo o la pierna. [place] suele pasar de un solo lado del cuerpo.  · Dificultad para caminar.  · Dificultad para  los brazos o las piernas.  · Pérdida del equilibrio o de la coordinación.  · Sentirse confundido.  · Dificultad para hablar o comprender lo que las personas dicen.  · Hablar arrastrando las palabras.  · Dificultad para duarte.  · Duarte dos imágenes de un único objeto (diplopia).  · Mareos.  · Malestar estomacal (náuseas) y vómitos.  · Dolor de  wily intenso sin razón aparente.  Obtenga ayuda apenas note cualquiera de estos problemas. [place] es importante. Algunos tratamientos son más eficaces si se aplican de inmediato. Estos incluyen los siguientes:  · Aspirina.  · Medicamentos para controlar la presión arterial.  · Quynh inyección de medicamentos para romper el coágulo sanguíneo.  · Tratamientos en los vasos sanguíneos (arterias) para eliminar el coágulo o romperlo.  Otros tratamientos pueden ser los siguientes:  · Oxígeno.  · Recibir líquidos por vía intravenosa.  · Medicamentos para diluir la lillian.  · Procedimientos para ayudar a que la lillian circule con mayor facilidad.  FACTORES DE RIESGO  Hay algunos factores que lo hacen más propenso a tener un ictus. Algunos de estos factores los puede controlar, andrea por ejemplo:  · Tener mucho sobrepeso (obesidad).  · Fumar.  · Usar anticonceptivos orales.  · Ser sedentario.  · Beber alcohol en exceso.  · Consumir drogas.  Otros factores de riesgo son los siguientes:  · Hipertensión arterial.  · Colesterol elevado.  · Diabetes.  · Cardiopatía coronaria.  · Ser afroamericano, norteamericano nativo, hispano o nativo de [place].  · Ser mayor de 60 años.  · Tener antecedentes familiares de ictus.  · Tener antecedentes de coágulos sanguíneos, ictus o ictus de advertencia (ataque isquémico transitorio, AIT).  · Anemia drepanocítica.  · Ser ashwin con antecedentes de hipertensión arterial en el embarazo (preeclampsia).  · Cefalea migrañosa.  · Apnea del sueño.  · Tener un ritmo cardíaco irregular (fibrilación auricular).  · Enfermedades a silvano plazo (crónicas) que causan dolor e hinchazón (inflamación).  · Trastornos que afectan la coagulación de la lillian.  CUIDADOS EN EL HOGAR  Medicamentos   · [place] los medicamentos de venta kenyon y los recetados solamente andrea se lo haya indicado el médico.  · Si le indicaron que tome aspirinas u otros medicamentos para diluir la lillian, tómelos exactamente andrea se lo haya indicado  el médico.  ¨ El exceso de estos medicamentos puede provocar vandana hemorragia.  ¨ Si no svitlana la cantidad suficiente, es posible que no leti tan eficaces.  · Conozca los efectos secundarios de los medicamentos. Si svitlana anticoagulantes, asegúrese de lo siguiente:  ¨ Ejercer presión sobre las heridas por más tiempo que lo habitual.  ¨ Informe a bliss dentista y a otros médicos que svitlana estos medicamentos.  ¨ Evite actividades que puedan causarle daños o lesiones en el cuerpo.  Comida y bebida   · Siga las indicaciones del médico respecto de lo que no puede comer o beber.  · Consuma alimentos saludables.  · Si tiene dificultades para tragar, meir lo siguiente para evitar ahogarse:  ¨ Coma de a porciones pequeñas.  ¨ Coma comidas blandas o en puré.  Seguridad   · Siga las indicaciones del equipo médico con respecto a la actividad física.  · Use un andador o un bastón según las indicaciones del médico.  · Meir de bliss hogar un lugar seguro para evitar caídas. [place] puede incluir lo siguiente:  ¨ Hacer que profesionales inspeccionen bliss casa para asegurarse de que sea jackman.  ¨ Colocar barras para sostén en la habitación y el baño.  ¨ Elevar el inodoro.  ¨ Colocar un asiento en la ducha.  Instrucciones generales   · No use productos que contengan tabaco.  ¨ Estos incluyen cigarrillos, tabaco para mascar y cigarrillos electrónicos.  ¨ Si necesita ayuda para dejar de fumar, consulte al médico.  · Limite la cantidad de alcohol que consume. [place] significa no más de 1 medida por día si es ashwin y no está embarazada, y de 2 medidas por día si es hombre. Vandana medida equivale a 12 onzas de cerveza, 5 onzas de vino o 1½ onzas de bebidas alcohólicas de annika graduación.  · Si necesita ayuda para dejar de consumir drogas o alcohol, pídale al médico que le recomiende un programa o que lo derive a un especialista.  · Manténgase activo. Meir ejercicios corona andrea le indicó el médico.  · Concurra a todas las visitas de control andrea se lo haya  indicado el médico. [place] es importante.  SOLICITE AYUDA DE INMEDIATO SI:  · De repente:  ¨ Tiene debilidad o pérdida de la sensibilidad en el gus, el brazo o la pierna.  ¨ Se siente confundido.  ¨ Tiene dificultad para hablar o comprender lo que las personas dicen.  ¨ Tiene dificultad para duarte.  ¨ Tiene dificultad para caminar.  ¨ Tiene dificultad para  los brazos o las piernas.  ¨ Se siente mareado.  ¨ Pierde el equilibrio o la coordinación.  ¨ Tiene dolor de wily intenso y no sabe por qué.  · Se desmaya (pierde el conocimiento) o estuvo a punto de desmayarse.  · Tiene movimientos espasmódicos que no puede controlar (convulsiones).  Estos síntomas pueden indicar vandana emergencia. No espere hasta que los síntomas desaparezcan. Solicite atención médica de inmediato. Comuníquese con el servicio de emergencias de bliss localidad (911 en los [place]). No conduzca por tory propios medios hasta el hospital.   Esta información no tiene andrea fin reemplazar el consejo del médico. Asegúrese de hacerle al médico cualquier pregunta que tenga.  Document Released: 12/06/2012 Document Revised: 01/08/2016 Document Reviewed: 03/15/2017  [employer] Interactive Patient Education © 2017 Elsevier Inc.

## 2019-06-14 NOTE — CARE PLAN
Problem: Discharge Barriers/Planning  Goal: Patient's continuum of care needs will be met    Intervention: Assess potential discharge barriers on admission and throughout hospital stay  Pt was educated on advanced diet, and diet parameters were explained by the SLP. Pt needs more education on diet after discharge.

## 2019-06-14 NOTE — THERAPY
"Speech Language Therapy dysphagia treatment completed.     Functional Status: Patient was seen on this date for dysphagia treatment. Son at bedside for session. Pt and son denied difficulty with speech or with word finding. Upper and lower dentures in place. PO consisted of 3 bites from D1, NTL, pudding, and trials of soft solids. Pt declined further trials of D1 due to strong distaste. No overt s/sx of aspiration appreciated all session. Patient had bolus loss x1, min oral residue x2, and slightly prolonged mastication. A liquid wash assisted in clearing oral residue.     Recommendations: At this time, patient appears at the level to upgrade to a D2/NTL diet given assistance with tray set up and intermittent supervision. Cue patient to alternate solids and liquids.     Plan of Care: Will benefit from Speech Therapy 5 times per week    Post-Acute Therapy: Recommend inpatient transitional care services for continued speech therapy services.      See \"Rehab Therapy-Acute\" Patient Summary Report for complete documentation.     "

## 2019-06-14 NOTE — PROGRESS NOTES
Spoke with Adrienne's family about discharging tonight. Zaina and her spouse will come to  the patient tonight between 8 and 9 PM. Pt was updated on the current plan.

## 2019-06-14 NOTE — CARE PLAN
Problem: Mobility  Goal: Risk for activity intolerance will decrease    Intervention: Assess and monitor signs of activity intolerance  Pt ambulates well independently, bed alarm removed. Pt knows to still call when she needs assistance.

## 2019-06-14 NOTE — CARE PLAN
Problem: Acute Care of the Stroke Patient  Goal: Optimal Outcome for the Stroke patient    Intervention: Vital Signs and Neuro Checks per order  Q4 neuro checks in place and charted appropriately.

## 2019-06-14 NOTE — PROGRESS NOTES
Monitor summary: SR 83-96, MD 0.16, QRS 0.08, QT 0.30, with rare PVCs and per strip from monitor room.

## 2019-06-14 NOTE — PROGRESS NOTES
Monitor summary: SR 66-83 and , GA 0.16, QRS 0.10, QT 0.36, with rare PVCs  per strip from monitor room.

## 2019-06-14 NOTE — PROGRESS NOTES
Assumed pt care at 1900. Received bedside report .    Pt Alert and oriented x  4. Pt denies, chest pain, sob, nausea/vomiting, headache, blurry/double vision. Pt denies numbness/tingling. Pt denies pain. Pt ambulating with handheld assist. [place] speaking only, utilizing  when necessary. POC discussed and education provided on administered medications. All questions and concerns addressed. Fall precautions/aspiration precautions, hourly rounding and Q4 hour neuro checks in place.

## 2019-06-15 NOTE — PROGRESS NOTES
Pt educated on D/C instructions, follow up, medications, and seeking medical attention as necessary. Pt verbalizes understanding, D/c'd via car with family, belongings with pt. All devices and lines removed.

## 2019-09-17 ENCOUNTER — TELEPHONE (OUTPATIENT)
Dept: PHYSICAL THERAPY | Facility: MEDICAL CENTER | Age: 74
End: 2019-09-17

## 2019-09-17 NOTE — THERAPY
Attempted to call patient without success. Modified [place] 7.   9/3/19: voicemail  9/17: pt Bhutanese speaking only; attempted to use  w/o success; pt was not understanding the purpose of the call

## 2021-01-15 DIAGNOSIS — Z23 NEED FOR VACCINATION: ICD-10-CM
